# Patient Record
Sex: FEMALE | Race: WHITE | NOT HISPANIC OR LATINO | Employment: OTHER | URBAN - METROPOLITAN AREA
[De-identification: names, ages, dates, MRNs, and addresses within clinical notes are randomized per-mention and may not be internally consistent; named-entity substitution may affect disease eponyms.]

---

## 2017-03-19 ENCOUNTER — HOSPITAL ENCOUNTER (EMERGENCY)
Facility: CLINIC | Age: 82
Discharge: HOME | End: 2017-03-19
Attending: EMERGENCY MEDICINE | Admitting: EMERGENCY MEDICINE
Payer: COMMERCIAL

## 2017-03-19 VITALS
TEMPERATURE: 99.1 F | OXYGEN SATURATION: 98 % | RESPIRATION RATE: 18 BRPM | BODY MASS INDEX: 27.31 KG/M2 | SYSTOLIC BLOOD PRESSURE: 176 MMHG | HEART RATE: 62 BPM | HEIGHT: 64 IN | WEIGHT: 160 LBS | DIASTOLIC BLOOD PRESSURE: 89 MMHG

## 2017-03-19 DIAGNOSIS — T78.40XA ALLERGIC REACTION, INITIAL ENCOUNTER: ICD-10-CM

## 2017-03-19 DIAGNOSIS — R21 FACIAL RASH: ICD-10-CM

## 2017-03-19 PROCEDURE — 99282 EMERGENCY DEPT VISIT SF MDM: CPT

## 2017-03-19 RX ORDER — DESONIDE 0.5 MG/G
CREAM TOPICAL
Qty: 60 G | Refills: 0 | Status: SHIPPED | OUTPATIENT
Start: 2017-03-19 | End: 2018-11-21

## 2017-03-19 RX ORDER — PREDNISONE 20 MG/1
TABLET ORAL
Qty: 12 TABLET | Refills: 0 | Status: SHIPPED | OUTPATIENT
Start: 2017-03-19 | End: 2017-05-05

## 2017-03-19 RX ORDER — PREDNISONE 20 MG/1
TABLET ORAL
Qty: 5 TABLET | Refills: 0 | Status: SHIPPED | OUTPATIENT
Start: 2017-03-19 | End: 2017-03-19

## 2017-03-19 ASSESSMENT — ENCOUNTER SYMPTOMS
EYE PAIN: 0
FEVER: 0
SHORTNESS OF BREATH: 0
CHILLS: 0

## 2017-03-19 NOTE — ED AVS SNAPSHOT
Emergency Department    [address] [facility] [address]    Phone:  [phone]    Fax:  [phone]                                       Mandy Jack   MRN: [id number]    Department:   Emergency Department   Date of Visit:  3/19/2017           Patient Information     Date Of Birth          10/2/1933        Your diagnoses for this visit were:     Facial rash     Allergic reaction, initial encounter        You were seen by Shakeel Garcia MD.      Follow-up Information     Follow up with Panchito Ly MD. Schedule an appointment as soon as possible for a visit in 1 week.    Specialty:  Dermatology    Why:  if not improving    Contact information:    [facility]  [address] [address] [phone]        Discharge References/Attachments     ALLERGIC REACTION, OTHER (GENERAL) (ENGLISH)    DERMATITIS, NONSPECIFIC (ENGLISH)      24 Hour Appointment Hotline       To make an appointment at any [facility], call [phone] ([phone]). If you don't have a family doctor or clinic, we will help you find one. [place] clinics are conveniently located to serve the needs of you and your family.             Review of your medicines      START taking        Dose / Directions Last dose taken    desonide 0.05 % cream   Commonly known as:  DESOWEN   Quantity:  60 g        Apply sparingly to affected area twice daily.   Refills:  0        predniSONE 20 MG tablet   Commonly known as:  DELTASONE   Quantity:  5 tablet        1 tab daily for 7 days, then 1/2 tab daily for 7 days   Refills:  0          Our records show that you are taking the medicines listed below. If these are incorrect, please call your family doctor or clinic.        Dose / Directions Last dose taken    ADVIL PO   Dose:  200 mg        Take 200 mg by mouth 4 times daily as needed for moderate pain   Refills:  0        ALDACTONE PO   Dose:  25 mg        Take 25 mg by mouth daily Take one  half tablet daily   Refills:  0        ASPIRIN PO   Dose:  81 mg        Take 81 mg by mouth daily   Refills:  0        AZASITE 1 % ophthalmic solution   Dose:  1 drop   Generic drug:  azithromycin        Place 1 drop Into the left eye At Bedtime   Refills:  0        CALTRATE PLUS OR        Take by mouth daily 600 mg- 400 units   Refills:  0        COZAAR PO   Dose:  25 mg        Take 25 mg by mouth daily   Refills:  0        hydrochlorothiazide 12.5 MG capsule   Commonly known as:  MICROZIDE   Dose:  12.5 mg        Take 12.5 mg by mouth every morning   Refills:  0        multivitamin, therapeutic Tabs tablet   Dose:  1 tablet        Take 1 tablet by mouth daily   Refills:  0        OMEPRAZOLE PO        Take by mouth daily as needed   Refills:  0        PROLENSA 0.07 % ophthalmic solution   Generic drug:  bromfenac        One drop left eye daily   Refills:  0        TOPROL XL PO   Dose:  50 mg        Take 50 mg by mouth 2 times daily Take 2 tablets daily   Refills:  0        ZOCOR PO   Dose:  20 mg        Take 20 mg by mouth At Bedtime   Refills:  0                Prescriptions were sent or printed at these locations (2 Prescriptions)                   Other Prescriptions                Printed at Department/Unit printer (2 of 2)         desonide (DESOWEN) 0.05 % cream               predniSONE (DELTASONE) 20 MG tablet                Orders Needing Specimen Collection     None      Pending Results     No orders found from 3/17/2017 to 3/20/2017.            Pending Culture Results     No orders found from 3/17/2017 to 3/20/2017.             Test Results from your hospital stay            Clinical Quality Measure: Blood Pressure Screening     Your blood pressure was checked while you were in the emergency department today. The last reading we obtained was  BP: 176/89 . Please read the guidelines below about what these numbers mean and what you should do about them.  If your systolic blood pressure (the top number) is  "less than 120 and your diastolic blood pressure (the bottom number) is less than 80, then your blood pressure is normal. There is nothing more that you need to do about it.  If your systolic blood pressure (the top number) is 120-139 or your diastolic blood pressure (the bottom number) is 80-89, your blood pressure may be higher than it should be. You should have your blood pressure rechecked within a year by a primary care provider.  If your systolic blood pressure (the top number) is 140 or greater or your diastolic blood pressure (the bottom number) is 90 or greater, you may have high blood pressure. High blood pressure is treatable, but if left untreated over time it can put you at risk for heart attack, stroke, or kidney failure. You should have your blood pressure rechecked by a primary care provider within the next 4 weeks.  If your provider in the emergency department today gave you specific instructions to follow-up with your doctor or provider even sooner than that, you should follow that instruction and not wait for up to 4 weeks for your follow-up visit.        Thank you for choosing [facility]       Thank you for choosing [facility] for your care. Our goal is always to provide you with excellent care. Hearing back from our patients is one way we can continue to improve our services. Please take a few minutes to complete the written survey that you may receive in the mail after you visit with us. Thank you!        Material Mix Information     Material Mix lets you send messages to your doctor, view your test results, renew your prescriptions, schedule appointments and more. To sign up, go to [email or web address] . Click on \"Log in\" on the left side of the screen, which will take you to the Welcome page. Then click on \"Sign up Now\" on the right side of the page.     You will be asked to enter the access code listed below, as well as some personal information. Please follow the directions to create your username and " password.     Your access code is: [email or web address]  Expires: 2017  1:46 PM     Your access code will  in 90 days. If you need help or a new code, please call your [facility] clinic or [phone].        Care EveryWhere ID     This is your Care EveryWhere ID. This could be used by other organizations to access your [facility] medical records  [id number]        After Visit Summary       This is your record. Keep this with you and show to your community pharmacist(s) and doctor(s) at your next visit.

## 2017-03-19 NOTE — ED PROVIDER NOTES
History     Chief Complaint:  Rash     HPI   Mandy Jack is a 83 year old female with a history significant for bilateral cataract surgery in 2015 ocular rosacea and facial eczema, who presents with a pruritic rash. During the first week of February, she had right upper eyelid erythema and warmth that then extended to the left. On Feb 23rd she was told by ophthalmologist that it was allergic, and was prescribed Tobradex. Before she began using it, she became all splotchy and red on the face. She started using the drops anyway. She went to get a 2nd ophthalmologist who gave her the same diagnosis. She kept track of activities and new substances used at home to go to the allergist said it could be related to visits and purchases at [employer]. She also stopped using Tobradex on march 5th. She was improving, until Tuesday when she went out and it worsened again. On Friday she talked to a pharmacist and took benadryl.   Yesterday she noticed the rash had extended to  her right anterior neck and hands. It is pruritic and burns, and her lips feel a little swollen, but no SOB, fever, chills, discharge. No visual changes or eye pain. She feels like topical hydrocortisone and the benadryl helped some. She took a benadryl at 8 am today.  and stopped taking the Tobradex on march 5th.   Her dermatologist is Dr. Panchito Ly.    Allergies:  Chlorthalidone   Meperidine  Nitrofurantoin  Septra  Tolterodine     Medications:    ASA  Azithromycin loph  Caltrate plus  HCTz  Losartan  Metoprolol  MVI  Simvastatin  Spironolactone  Bromfenac  Omeprazole     Past Medical History:    Cervical DDD  HTN  Dyslipidemia  Liver disease, nonalcoholic  Recurrent cystitis  Rosacea     Past Surgical History:    JENNI with bilateral salpingo-oophorectomy  Bladder suspension  Cataract surgery bilateral  2015    Family History:    History is non-contributory.     Social History:  Marital Status:   [2]  Smoking status: negative  "  Alcohol status: positive   Patient presents with .  PCP: Vincenzo Hassan      Review of Systems   Constitutional: Negative for chills and fever.   Eyes: Negative for pain and visual disturbance.   Respiratory: Negative for shortness of breath.    Skin: Positive for rash (pruritic and \"burning\").   All other systems reviewed and are negative.      Physical Exam   First Vitals:  BP: 181/76  Pulse: 67  Temp: 99.1  F (37.3  C)  Resp: 18  Height: 162.6 cm (5' 4\")  Weight: 72.6 kg (160 lb)  SpO2: 96 %      Physical Exam  Constitutional: Appears well-developed and well-nourished. Cooperative.    HENT:   Head: Atraumatic.   Mouth/Throat: Oropharynx is without erythema or exudate and mucous membranes are moist.   Eyes: Cornea and conjunctivae normal. EOMI. Pupils are equal, round, and reactive to light. No photophobia.  Neck: Normal range of motion. Neck supple.  Cardiovascular: Normal rate, regular rhythm, normal heart sounds and radial and dorsalis pedis pulses are 2+ and symmetric.   Pulmonary/Chest: Effort normal and breath sounds normal.   Abdominal: Soft. Bowel sounds are normal. No splenomegaly or hepatomegaly. No tenderness. No rebound.  Musculoskeletal: Normal range of motion. No edema and no tenderness.  Neurological: Alert. Normal strength. No cranial nerve deficit. GCS 15.   Skin: Erythematous rash extending from forehead to chin on the right, to cheek on the left. Blotchy erythematous rash on right anterior neck extending down to the clavicle and on dorsum of both thumbs.  Psychiatric: Normal mood and affect.       Emergency Department Course     Emergency Department Course:  Nursing notes and vitals reviewed.  I performed an exam of the patient as documented above.      At 1303, I discussed the patient with ANGELI Diggs, from Dr. Ly's office.    I discussed the findings and treatment plan with the patient. They expressed understanding of this plan and consented to discharge. They will be discharged home " with instructions for care and follow up. In addition, the patient will return to the emergency department if their symptoms persist, worsen, if new symptoms arise or if there is any concern.  All questions were answered.     Impression & Plan      Medical Decision Making:  The patient presents complaining of facial rash/redness and burning sensation, itching and irritation x 2 weeks. Symptoms started after what was thought to be an eyelid infection and using Tobradex drops.   Patient has taken benadryl for a couple of days, symptoms transiently improved but then returned. She presents because her pharmacist advised her to get seen if not improving.   Here the patient has what looks like contact dermatitis or perhaps an allergic/eczematous rash. She has a history of eczema. There are no sign of signs of serious allergic reaction, cellulitis or infection. I talked to the PA on call for the patient's dermatologist who recommended topical desonide and adding low-dose Prednisone if the desonide is not improving symptoms over the next few days.  I discussed the plan with the patient and  and they will start the desonide today and if symptoms do not improve or worsen she will begin with the oral steroids. She will follow-up with dermatology next week. She will continue benadryl for itching. She was discharged in good condition.    Diagnosis:    ICD-10-CM    1. Facial rash R21    2. Allergic reaction, initial encounter T78.40XA      Disposition:   Discharge     Discharge Medications:  New Prescriptions    DESONIDE (DESOWEN) 0.05 % CREAM    Apply sparingly to affected area twice daily.    PREDNISONE (DELTASONE) 20 MG TABLET    1 tab daily for 7 days, then 1/2 tab daily for 7 days       Scribe Disclosure:  SUSAN, Le Brewster, am serving as a scribe at 12:42 PM on 3/19/2017 to document services personally performed by Shakeel Garcia MD, based on my observations and the provider's statements to me.     EMERGENCY  DEPARTMENT       Isely, Shakeel Monterroso MD  03/23/17 [id number]

## 2017-03-19 NOTE — ED AVS SNAPSHOT
Emergency Department    [address] [place] [address]    Phone:  [phone]    Fax:  [phone]                                       Mandy Jack   MRN: [id number]    Department:   Emergency Department   Date of Visit:  3/19/2017           After Visit Summary Signature Page     I have received my discharge instructions, and my questions have been answered. I have discussed any challenges I see with this plan with the nurse or doctor.    ..........................................................................................................................................  Patient/Patient Representative Signature      ..........................................................................................................................................  Patient Representative Print Name and Relationship to Patient    ..................................................               ................................................  Date                                            Time    ..........................................................................................................................................  Reviewed by Signature/Title    ...................................................              ..............................................  Date                                                            Time

## 2017-04-27 ENCOUNTER — PRE VISIT (OUTPATIENT)
Dept: CARDIOLOGY | Facility: CLINIC | Age: 82
End: 2017-04-27

## 2017-05-05 ENCOUNTER — OFFICE VISIT (OUTPATIENT)
Dept: CARDIOLOGY | Facility: CLINIC | Age: 82
End: 2017-05-05
Payer: COMMERCIAL

## 2017-05-05 VITALS
BODY MASS INDEX: 26.29 KG/M2 | DIASTOLIC BLOOD PRESSURE: 72 MMHG | SYSTOLIC BLOOD PRESSURE: 128 MMHG | HEART RATE: 72 BPM | WEIGHT: 154 LBS | HEIGHT: 64 IN

## 2017-05-05 DIAGNOSIS — R00.2 PALPITATIONS: Primary | ICD-10-CM

## 2017-05-05 PROCEDURE — 93000 ELECTROCARDIOGRAM COMPLETE: CPT | Performed by: INTERNAL MEDICINE

## 2017-05-05 PROCEDURE — 99204 OFFICE O/P NEW MOD 45 MIN: CPT | Mod: 25 | Performed by: INTERNAL MEDICINE

## 2017-05-05 RX ORDER — MULTIVITAMIN WITH IRON
1 TABLET ORAL DAILY
COMMUNITY
End: 2022-01-01

## 2017-05-05 NOTE — MR AVS SNAPSHOT
"              After Visit Summary   5/5/2017    Mandy Jack    MRN: [id number]           Patient Information     Date Of Birth          10/2/1933        Visit Information        Provider Department      5/5/2017 10:15 AM Sandor Ngo MD [facility] [place]        Today's Diagnoses     Palpitations    -  1       Follow-ups after your visit        Future tests that were ordered for you today     Open Future Orders        Priority Expected Expires Ordered    US Lower Extremity Arterial Duplex Bilateral Routine 5/12/2017 5/5/2018 5/5/2017    US Low Ext Arterial Dop Seg Pres w Ex (VENKAT with Exercise) Routine 5/12/2017 5/5/2018 5/5/2017    Echocardiogram Routine 5/12/2017 5/5/2018 5/5/2017    Holter Monitor 48 hour - Adult Routine 5/12/2017 5/5/2018 5/5/2017            Who to contact     If you have questions or need follow up information about today's clinic visit or your schedule please contact [facility] directly at [phone].  Normal or non-critical lab and imaging results will be communicated to you by Crowdlyhart, letter or phone within 4 business days after the clinic has received the results. If you do not hear from us within 7 days, please contact the clinic through Crowdly[employer] or phone. If you have a critical or abnormal lab result, we will notify you by phone as soon as possible.  Submit refill requests through [employer] or call your pharmacy and they will forward the refill request to us. Please allow 3 business days for your refill to be completed.          Additional Information About Your Visit        MyChart Information     Prolebrity lets you send messages to your doctor, view your test results, renew your prescriptions, schedule appointments and more. To sign up, go to [email or web address] . Click on \"Log in\" on the left side of the screen, which will take you to the Welcome page. Then click on \"Sign up Now\" on the right " "side of the page.     You will be asked to enter the access code listed below, as well as some personal information. Please follow the directions to create your username and password.     Your access code is: [email or web address]  Expires: 2017  1:46 PM     Your access code will  in 90 days. If you need help or a new code, please call your [facility] clinic or [phone].        Care EveryWhere ID     This is your Care EveryWhere ID. This could be used by other organizations to access your [facility] medical records  [id number]        Your Vitals Were     Pulse Height BMI (Body Mass Index)             72 1.613 m (5' 3.5\") 26.85 kg/m2          Blood Pressure from Last 3 Encounters:   17 128/72   17 176/89   06/10/15 140/74    Weight from Last 3 Encounters:   17 69.9 kg (154 lb)   17 72.6 kg (160 lb)   05/18/15 75.4 kg (166 lb 3.2 oz)              We Performed the Following     EKG 12-lead complete w/read - Clinics (performed today)        Primary Care Provider Office Phone # Fax #    Vincenzo MD Mo [phone]       [facility] [address] [facility] [address]        Thank you!     Thank you for choosing [facility] [place]  for your care. Our goal is always to provide you with excellent care. Hearing back from our patients is one way we can continue to improve our services. Please take a few minutes to complete the written survey that you may receive in the mail after your visit with us. Thank you!             Your Updated Medication List - Protect others around you: Learn how to safely use, store and throw away your medicines at www.disposemymeds.org.          This list is accurate as of: 17 10:17 AM.  Always use your most recent med list.                   Brand Name Dispense Instructions for use    ADVIL PO      Take 200 mg by mouth 4 times daily as needed for moderate pain       ALDACTONE PO      Take 25 mg by mouth daily Take one " half tablet daily       ASPIRIN PO      Take 81 mg by mouth daily       CALTRATE PLUS OR      Take by mouth daily 600 mg- 400 units       COZAAR PO      Take 25 mg by mouth daily       desonide 0.05 % cream    DESOWEN    60 g    Apply sparingly to affected area twice daily.       hydrochlorothiazide 12.5 MG capsule    MICROZIDE     Take 12.5 mg by mouth every morning       magnesium 250 MG tablet      Take 1 tablet by mouth daily       multivitamin, therapeutic Tabs tablet      Take 1 tablet by mouth daily       OMEPRAZOLE PO      Take by mouth daily as needed       TOPROL XL PO      Take 50 mg by mouth daily Take 2 tablets daily       ZOCOR PO      Take 20 mg by mouth At Bedtime

## 2017-05-05 NOTE — LETTER
"5/5/2017    Vincenzo Hassan MD  [facility]  [address] [place] [address]    RE: Mandy Jack       Dear Colleague,    I had the pleasure of seeing Mandy Jack in the [facility].    HPI and Plan:   See dictation  [id number]    Physical Exam:  Vitals: /72  Pulse 72  Ht 1.613 m (5' 3.5\")  Wt 69.9 kg (154 lb)  BMI 26.85 kg/m2    Constitutional:  AAO x3.  Pt is in NAD.  HEAD: normocephalic.  SKIN: Skin normal color, texture and turgor with no lesions or eruptions.  Eyes: PERRL, EOMI.  ENT:  Supple, normal JVP. No lymphadenopathy or thyroid enlargement.  Chest:  CTAB.  Cardiac:   RRR, normal  S1 and S2.  No murmurs rubs or gallop.    Abdomen:  Normal BS.  Soft, non-tender and non-distended.  No rebound or guarding.    Extremities:  Pedious pulses palpable B/L.  No LE edema noticed.   Neurological: Strength and sensation grossly symmetric and intact throughout.       CURRENT MEDICATIONS:  Current Outpatient Prescriptions   Medication Sig Dispense Refill     magnesium 250 MG tablet Take 1 tablet by mouth daily       desonide (DESOWEN) 0.05 % cream Apply sparingly to affected area twice daily. 60 g 0     ASPIRIN PO Take 81 mg by mouth daily       Calcium Carbonate-Vit D-Min (CALTRATE PLUS OR) Take by mouth daily 600 mg- 400 units       hydrochlorothiazide (MICROZIDE) 12.5 MG capsule Take 12.5 mg by mouth every morning       Ibuprofen (ADVIL PO) Take 200 mg by mouth 4 times daily as needed for moderate pain       Losartan Potassium (COZAAR PO) Take 25 mg by mouth daily       Metoprolol Succinate (TOPROL XL PO) Take 50 mg by mouth daily Take 2 tablets daily        multivitamin, therapeutic (THERA-VIT) TABS Take 1 tablet by mouth daily       OMEPRAZOLE PO Take by mouth daily as needed       Simvastatin (ZOCOR PO) Take 20 mg by mouth At Bedtime       Spironolactone (ALDACTONE PO) Take 25 mg by mouth daily Take one half tablet daily         ALLERGIES     Allergies   Allergen " Reactions     Chlorthalidone      Meperidine      Nitrofurantoin      Septra [Sulfamethoxazole W/Trimethoprim]      Tolterodine        PAST MEDICAL HISTORY:  Past Medical History:   Diagnosis Date     Degeneration of cervical intervertebral disc      Hypertension 12/20/2016     Mixed hyperlipidemia      Other chronic nonalcoholic liver disease     steatosis of liver     Other premature beats     symptomatic PVCS     Recurrent cystitis      Rosacea     rosacea conjunctivitis     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       PAST SURGICAL HISTORY:  Past Surgical History:   Procedure Laterality Date     GENITOURINARY SURGERY      bladder suspension     GYN SURGERY      total abdominal hysterectomy     GYN SURGERY      oophorectomy     PHACOEMULSIFICATION CLEAR CORNEA WITH TORIC INTRAOCULAR LENS IMPLANT Left 5/20/2015    Procedure: PHACOEMULSIFICATION CLEAR CORNEA WITH TORIC INTRAOCULAR LENS IMPLANT;  Surgeon: Marquise Hung MD;  Location: [facility]     PHACOEMULSIFICATION CLEAR CORNEA WITH TORIC INTRAOCULAR LENS IMPLANT Right 6/10/2015    Procedure: PHACOEMULSIFICATION CLEAR CORNEA WITH TORIC INTRAOCULAR LENS IMPLANT;  Surgeon: Marquise Hung MD;  Location: [facility]       FAMILY HISTORY:  No family history on file.    SOCIAL HISTORY:  Social History     Social History     Marital status:      Spouse name: N/A     Number of children: N/A     Years of education: N/A     Social History Main Topics     Smoking status: Never Smoker     Smokeless tobacco: None     Alcohol use Yes     Drug use: None     Sexual activity: Not Asked     Other Topics Concern     None     Social History Narrative       Review of Systems:  Skin:  Negative     Eyes:  Negative    ENT:  Negative    Respiratory:  Negative    Cardiovascular:  Negative for;chest pain;edema Positive for;palpitations  Gastroenterology: Negative    Genitourinary:  Positive for urinary frequency;urinary tract infection  Musculoskeletal:  Negative    Neurologic:  Negative     Psychiatric:  Negative    Heme/Lymph/Imm:  Negative    Endocrine:  Positive for diabetes      Recent Lab Results:  LIPID RESULTS:  No results found for: CHOL, HDL, LDL, TRIG, CHOLHDLRATIO    LIVER ENZYME RESULTS:  No results found for: AST, ALT    CBC RESULTS:  No results found for: WBC, RBC, HGB, HCT, MCV, MCH, MCHC, RDW, PLT    BMP RESULTS:  No results found for: NA, POTASSIUM, CHLORIDE, CO2, ANIONGAP, GLC, BUN, CR, GFRESTIMATED, GFRESTBLACK, JAVIER     A1C RESULTS:  No results found for: A1C    INR RESULTS:  No results found for: INR      ECHOCARDIOGRAM  No results found for this or any previous visit (from the past 8760 hour(s)).      Orders Placed This Encounter   Procedures     US Lower Extremity Arterial Duplex Bilateral     US Low Ext Arterial Dop Seg Pres w Ex (VENKAT with Exercise)     EKG 12-lead complete w/read - Clinics (performed today)     Holter Monitor 48 hour - Adult     Echocardiogram     Orders Placed This Encounter   Medications     magnesium 250 MG tablet     Sig: Take 1 tablet by mouth daily     Medications Discontinued During This Encounter   Medication Reason     azithromycin (AZASITE) 1 % ophthalmic solution Stopped by Patient     Bromfenac Sodium (PROLENSA) 0.07 % SOLN Stopped by Patient     predniSONE (DELTASONE) 20 MG tablet Stopped by Patient         Encounter Diagnosis   Name Primary?     Palpitations Yes         CC  Vincenzo Hassan MD  [facility]  [address] [place] [address]                REASON FOR VISIT:  Evaluation of PVCs.      HISTORY OF PRESENT ILLNESS:  Ms. Jack is a delightful 83-year-old lady with a history of hypertension, hyperlipidemia and previous diverticulitis who is here for evaluation of PVCs.      She was followed at the [facility] and is here to establish care with us.  Apparently, in 2014 she was found to have increased PVC burden which was asymptomatic.  At that time, echocardiogram revealed normal cardiac function and she was started on beta blockers.       During this visit, she denies any palpitations.  The only complaint she has is related to lower extremity pain which apparently improved after she started taking supplementation of magnesium.  She also affirms that there are moments that she feels like claudication (pain when walking) which get better after resting.      EKG done here today showed normal sinus rhythm.  Echocardiogram 10/2014 was within normal limits.      ASSESSMENT AND PLAN:   1.  Increased PVC burden.  I recommend repeating a Holter to evaluate PVC burden.  We will continue current metoprolol therapy for now.   2.  Lower extremity pain.  She complais of claudication; however, the pain  seems to be atypical for peripheral arterial disease.  I recommend duplex studies.   3.  Hypertension.  Blood pressure is well controlled.  Continue losartan and beta blockers.  We will obtain an echocardiogram.   4.  Follow up in clinic in a year or earlier as needed.         SANDOR TRAN MD             D: 2017 10:17   T: 2017 23:00   MT: NED      Name:     NAOMI NDIAYE   MRN:      [id number]        Account:      [id number]   :      10/02/1933           Service Date: 2017      Document: [id number]    Thank you for allowing me to participate in the care of your patient.    Sincerely,     Sandor Tran MD     [facility]

## 2017-05-05 NOTE — PROGRESS NOTES
"HPI and Plan:   See dictation  [id number]    Physical Exam:  Vitals: /72  Pulse 72  Ht 1.613 m (5' 3.5\")  Wt 69.9 kg (154 lb)  BMI 26.85 kg/m2    Constitutional:  AAO x3.  Pt is in NAD.  HEAD: normocephalic.  SKIN: Skin normal color, texture and turgor with no lesions or eruptions.  Eyes: PERRL, EOMI.  ENT:  Supple, normal JVP. No lymphadenopathy or thyroid enlargement.  Chest:  CTAB.  Cardiac:   RRR, normal  S1 and S2.  No murmurs rubs or gallop.    Abdomen:  Normal BS.  Soft, non-tender and non-distended.  No rebound or guarding.    Extremities:  Pedious pulses palpable B/L.  No LE edema noticed.   Neurological: Strength and sensation grossly symmetric and intact throughout.       CURRENT MEDICATIONS:  Current Outpatient Prescriptions   Medication Sig Dispense Refill     magnesium 250 MG tablet Take 1 tablet by mouth daily       desonide (DESOWEN) 0.05 % cream Apply sparingly to affected area twice daily. 60 g 0     ASPIRIN PO Take 81 mg by mouth daily       Calcium Carbonate-Vit D-Min (CALTRATE PLUS OR) Take by mouth daily 600 mg- 400 units       hydrochlorothiazide (MICROZIDE) 12.5 MG capsule Take 12.5 mg by mouth every morning       Ibuprofen (ADVIL PO) Take 200 mg by mouth 4 times daily as needed for moderate pain       Losartan Potassium (COZAAR PO) Take 25 mg by mouth daily       Metoprolol Succinate (TOPROL XL PO) Take 50 mg by mouth daily Take 2 tablets daily        multivitamin, therapeutic (THERA-VIT) TABS Take 1 tablet by mouth daily       OMEPRAZOLE PO Take by mouth daily as needed       Simvastatin (ZOCOR PO) Take 20 mg by mouth At Bedtime       Spironolactone (ALDACTONE PO) Take 25 mg by mouth daily Take one half tablet daily         ALLERGIES     Allergies   Allergen Reactions     Chlorthalidone      Meperidine      Nitrofurantoin      Septra [Sulfamethoxazole W/Trimethoprim]      Tolterodine        PAST MEDICAL HISTORY:  Past Medical History:   Diagnosis Date     Degeneration of cervical " intervertebral disc      Hypertension 12/20/2016     Mixed hyperlipidemia      Other chronic nonalcoholic liver disease     steatosis of liver     Other premature beats     symptomatic PVCS     Recurrent cystitis      Rosacea     rosacea conjunctivitis     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       PAST SURGICAL HISTORY:  Past Surgical History:   Procedure Laterality Date     GENITOURINARY SURGERY      bladder suspension     GYN SURGERY      total abdominal hysterectomy     GYN SURGERY      oophorectomy     PHACOEMULSIFICATION CLEAR CORNEA WITH TORIC INTRAOCULAR LENS IMPLANT Left 5/20/2015    Procedure: PHACOEMULSIFICATION CLEAR CORNEA WITH TORIC INTRAOCULAR LENS IMPLANT;  Surgeon: Marquise Hung MD;  Location: [facility]     PHACOEMULSIFICATION CLEAR CORNEA WITH TORIC INTRAOCULAR LENS IMPLANT Right 6/10/2015    Procedure: PHACOEMULSIFICATION CLEAR CORNEA WITH TORIC INTRAOCULAR LENS IMPLANT;  Surgeon: Marquise Hung MD;  Location: [facility]       FAMILY HISTORY:  No family history on file.    SOCIAL HISTORY:  Social History     Social History     Marital status:      Spouse name: N/A     Number of children: N/A     Years of education: N/A     Social History Main Topics     Smoking status: Never Smoker     Smokeless tobacco: None     Alcohol use Yes     Drug use: None     Sexual activity: Not Asked     Other Topics Concern     None     Social History Narrative       Review of Systems:  Skin:  Negative     Eyes:  Negative    ENT:  Negative    Respiratory:  Negative    Cardiovascular:  Negative for;chest pain;edema Positive for;palpitations  Gastroenterology: Negative    Genitourinary:  Positive for urinary frequency;urinary tract infection  Musculoskeletal:  Negative    Neurologic:  Negative    Psychiatric:  Negative    Heme/Lymph/Imm:  Negative    Endocrine:  Positive for diabetes      Recent Lab Results:  LIPID RESULTS:  No results found for: CHOL, HDL, LDL, TRIG, CHOLHDLRATIO    LIVER ENZYME RESULTS:  No results found for:  AST, ALT    CBC RESULTS:  No results found for: WBC, RBC, HGB, HCT, MCV, MCH, MCHC, RDW, PLT    BMP RESULTS:  No results found for: NA, POTASSIUM, CHLORIDE, CO2, ANIONGAP, GLC, BUN, CR, GFRESTIMATED, GFRESTBLACK, JAVIER     A1C RESULTS:  No results found for: A1C    INR RESULTS:  No results found for: INR      ECHOCARDIOGRAM  No results found for this or any previous visit (from the past 8760 hour(s)).      Orders Placed This Encounter   Procedures     US Lower Extremity Arterial Duplex Bilateral     US Low Ext Arterial Dop Seg Pres w Ex (VENKAT with Exercise)     EKG 12-lead complete w/read - Clinics (performed today)     Holter Monitor 48 hour - Adult     Echocardiogram     Orders Placed This Encounter   Medications     magnesium 250 MG tablet     Sig: Take 1 tablet by mouth daily     Medications Discontinued During This Encounter   Medication Reason     azithromycin (AZASITE) 1 % ophthalmic solution Stopped by Patient     Bromfenac Sodium (PROLENSA) 0.07 % SOLN Stopped by Patient     predniSONE (DELTASONE) 20 MG tablet Stopped by Patient         Encounter Diagnosis   Name Primary?     Palpitations Yes         CC  Vincenzo Hassan MD  [facility]  [address] [place] [address]

## 2017-05-06 NOTE — PROGRESS NOTES
REASON FOR VISIT:  Evaluation of PVCs.      HISTORY OF PRESENT ILLNESS:  Ms. Jack is a delightful 83-year-old lady with a history of hypertension, hyperlipidemia and previous diverticulitis who is here for evaluation of PVCs.      She was followed at the [facility] and is here to establish care with us.  Apparently, in  she was found to have increased PVC burden which was asymptomatic.  At that time, echocardiogram revealed normal cardiac function and she was started on beta blockers.      During this visit, she denies any palpitations.  The only complaint she has is related to lower extremity pain which apparently improved after she started taking supplementation of magnesium.  She also affirms that there are moments that she feels like claudication (pain when walking) which get better after resting.      EKG done here today showed normal sinus rhythm.  Echocardiogram 10/2014 was within normal limits.      ASSESSMENT AND PLAN:   1.  Increased PVC burden.  I recommend repeating a Holter to evaluate PVC burden.  We will continue current metoprolol therapy for now.   2.  Lower extremity pain.  She complais of claudication; however, the pain  seems to be atypical for peripheral arterial disease.  I recommend duplex studies.   3.  Hypertension.  Blood pressure is well controlled.  Continue losartan and beta blockers.  We will obtain an echocardiogram.   4.  Follow up in clinic in a year or earlier as needed.         JUNIOR TRAN MD             D: 2017 10:17   T: 2017 23:00   MT: NED      Name:     NAOMI JACK   MRN:      [id number]        Account:      [id number]   :      10/02/1933           Service Date: 2017      Document: [id number]

## 2017-05-12 ENCOUNTER — HOSPITAL ENCOUNTER (OUTPATIENT)
Dept: CARDIOLOGY | Facility: CLINIC | Age: 82
Discharge: HOME | End: 2017-05-12
Attending: INTERNAL MEDICINE | Admitting: INTERNAL MEDICINE
Payer: COMMERCIAL

## 2017-05-12 ENCOUNTER — HOSPITAL ENCOUNTER (OUTPATIENT)
Dept: CARDIOLOGY | Facility: CLINIC | Age: 82
End: 2017-05-12
Attending: INTERNAL MEDICINE
Payer: COMMERCIAL

## 2017-05-12 DIAGNOSIS — R00.2 PALPITATIONS: ICD-10-CM

## 2017-05-12 PROCEDURE — 93226 XTRNL ECG REC<48 HR SCAN A/R: CPT

## 2017-05-12 PROCEDURE — 93227 XTRNL ECG REC<48 HR R&I: CPT | Performed by: INTERNAL MEDICINE

## 2017-05-12 PROCEDURE — 93306 TTE W/DOPPLER COMPLETE: CPT | Mod: 26 | Performed by: INTERNAL MEDICINE

## 2017-05-12 PROCEDURE — 93306 TTE W/DOPPLER COMPLETE: CPT

## 2017-05-15 ENCOUNTER — HOSPITAL ENCOUNTER (OUTPATIENT)
Dept: ULTRASOUND IMAGING | Facility: CLINIC | Age: 82
End: 2017-05-15
Attending: INTERNAL MEDICINE
Payer: COMMERCIAL

## 2017-05-15 ENCOUNTER — TELEPHONE (OUTPATIENT)
Dept: CARDIOLOGY | Facility: CLINIC | Age: 82
End: 2017-05-15

## 2017-05-15 DIAGNOSIS — R00.2 PALPITATIONS: ICD-10-CM

## 2017-05-15 PROCEDURE — 93924 LWR XTR VASC STDY BILAT: CPT

## 2017-05-15 PROCEDURE — 93924 LWR XTR VASC STDY BILAT: CPT | Mod: 26 | Performed by: INTERNAL MEDICINE

## 2017-05-15 NOTE — TELEPHONE ENCOUNTER
Pt calling to see if she can f/u with Dr Ngo on 5/18/17 since her  has an appt on that day. She would like to review holter, ultra sound, and echo with Dr Nog. Informed her that she should not need to f/u with him for results as long as they were unchanged.

## 2017-05-17 ENCOUNTER — TELEPHONE (OUTPATIENT)
Dept: CARDIOLOGY | Facility: CLINIC | Age: 82
End: 2017-05-17

## 2017-05-17 DIAGNOSIS — I49.3 PVC'S (PREMATURE VENTRICULAR CONTRACTIONS): Primary | ICD-10-CM

## 2017-05-17 NOTE — TELEPHONE ENCOUNTER
Message  Received: Today       Sandor Ngo MD  P [facility] Heart Ep Nurse                     Holter showed about 10% PVC burden but echo showed normal cardiac function and she is asymptomatic.  No change in therapy.  Please update pt on results/plan.  Sandor Ngo MD              Writer called pt with results as above and she verbalized understanding. Denies symptoms, will continue with current therapy. LANDY Zavala RN.

## 2017-05-17 NOTE — TELEPHONE ENCOUNTER
Patient informed of echo results and to follow-up in a year per Dr. Ngo's note. Order for follow-up placed in Epic. Patient verbalized understanding. Royal

## 2017-06-06 ENCOUNTER — TELEPHONE (OUTPATIENT)
Dept: CARDIOLOGY | Facility: CLINIC | Age: 82
End: 2017-06-06

## 2017-06-06 NOTE — TELEPHONE ENCOUNTER
Writer called pt with results of US as above. Pt requesting Vascular Consult for consultation regarding varicose veins. Phone call transferred to scheduling. LANDY Zavala RN.

## 2017-06-06 NOTE — TELEPHONE ENCOUNTER
Left message informing patient that VENKAT results are available. Asked patient contact EP RN for results. Royal

## 2017-06-07 ENCOUNTER — PRE VISIT (OUTPATIENT)
Dept: CARDIOLOGY | Facility: CLINIC | Age: 82
End: 2017-06-07

## 2017-06-14 ENCOUNTER — OFFICE VISIT (OUTPATIENT)
Dept: CARDIOLOGY | Facility: CLINIC | Age: 82
End: 2017-06-14
Payer: COMMERCIAL

## 2017-06-14 VITALS
DIASTOLIC BLOOD PRESSURE: 85 MMHG | HEIGHT: 64 IN | WEIGHT: 153.2 LBS | BODY MASS INDEX: 26.15 KG/M2 | HEART RATE: 64 BPM | SYSTOLIC BLOOD PRESSURE: 158 MMHG

## 2017-06-14 DIAGNOSIS — R60.9 EDEMA, UNSPECIFIED TYPE: ICD-10-CM

## 2017-06-14 DIAGNOSIS — I49.3 PVC'S (PREMATURE VENTRICULAR CONTRACTIONS): Primary | ICD-10-CM

## 2017-06-14 PROCEDURE — 99213 OFFICE O/P EST LOW 20 MIN: CPT | Performed by: NURSE PRACTITIONER

## 2017-06-14 RX ORDER — SIMVASTATIN 20 MG
TABLET ORAL AT BEDTIME
Status: ON HOLD | COMMUNITY
End: 2022-01-01

## 2017-06-14 RX ORDER — SPIRONOLACTONE 25 MG/1
TABLET ORAL
Status: ON HOLD | COMMUNITY
End: 2022-01-01

## 2017-06-14 RX ORDER — METOPROLOL SUCCINATE 50 MG/1
50 TABLET, EXTENDED RELEASE ORAL
COMMUNITY

## 2017-06-14 RX ORDER — LOSARTAN POTASSIUM 25 MG/1
25 TABLET ORAL DAILY
COMMUNITY
End: 2017-08-07

## 2017-06-14 NOTE — LETTER
6/14/2017    Vincenzo Hassan MD  [facility]   [address]  [place] [address]    RE: Mandy Jack       Dear Colleague,    I had the pleasure of meeting Ms. Mandy Jack in Vascular Clinic.  She is a delightful 83-year-old who is new to our clinic.  She has a history of hypertension, hyperlipidemia and diverticulitis.  She used to go to [facility] but transferred her care to [facility] when her  had a heart attack requiring surgery, he now follows with Dr. Rush.  She saw Dr. Ngo in May for evaluation of her PVCs.  Her echocardiogram showed normal LV systolic function.  A 48-hour Holter monitor showed a minimum heart rate of 72, a maximum of 104 and average of 64 beats per minute with no pauses noted greater than 2 seconds.  She was on 50 mg of Toprol-XL daily when she wore this.  Her primary rhythm was sinus with frequent PVCs.  She had over 20,000 ventricular ectopics in the 48 hours that she wore it.  She marked the event monitor 4 times and was noted to have frequent PVCs during those times.  Because her LV function was normal, Dr. Ngo made no further changes.  She did have a complaint, when she saw him, of some claudication-type symptoms that had apparently gotten worse since she was spending time with her  in the hospital and not as active as normal.  He recommended exercise ABIs which were done and which did not find any hemodynamically significant obstructive peripheral arterial disease.  She is here today to discuss her varicose veins.      Today Mandy says that she had a history of phlebitis about 30 years ago but otherwise has not had any significant vein problems except that she noticed that when her  was in the hospital a couple months ago she did start having some pain in her legs.  She has a lot of telangiectasias in her feet and ankles and some more obvious larger varicosities in her calves.  She sometimes gets pain at night and has found a  little bit of relief with magnesium but not complete relief.  The pain is mostly on her right leg in the inner aspect of her middle calf and it can get better if she applies pressure with her opposing foot.  She denies any significant swelling at the end of the day.  She did take her 's CATALINA hose from the hospital.  She has been wearing those and says that they help with her symptoms quite a lot.      PHYSICAL EXAMINATION:   GENERAL:  An 83-year-old woman, appears comfortable.   VITAL SIGNS:  Blood pressure 158/85, heart rate 64, weight 153 pounds, BMI 26.   LUNGS:  Clear throughout to auscultation.   CARDIOVASCULAR:  Rate regular, normal S1, S2.   EXTREMITIES:  She has bilateral telangiectasias in her ankles and feet with varicosities extending bilaterally and up into her knee and she has some venous stasis changes.        Outpatient Encounter Prescriptions as of 6/14/2017   Medication Sig Dispense Refill     simvastatin (ZOCOR) 20 MG tablet Take by mouth At Bedtime       spironolactone (ALDACTONE) 25 MG tablet Take 12.5 mg by mouth       losartan (COZAAR) 25 MG tablet Take 25 mg by mouth daily       metoprolol (TOPROL-XL) 50 MG 24 hr tablet Take 50 mg by mouth       magnesium 250 MG tablet Take 1 tablet by mouth daily       desonide (DESOWEN) 0.05 % cream Apply sparingly to affected area twice daily. 60 g 0     ASPIRIN PO Take 81 mg by mouth daily       Calcium Carbonate-Vit D-Min (CALTRATE PLUS OR) Take by mouth daily 600 mg- 400 units       hydrochlorothiazide (MICROZIDE) 12.5 MG capsule Take 12.5 mg by mouth every morning       Ibuprofen (ADVIL PO) Take 200 mg by mouth 4 times daily as needed for moderate pain       multivitamin, therapeutic (THERA-VIT) TABS Take 1 tablet by mouth daily       [DISCONTINUED] Losartan Potassium (COZAAR PO) Take 25 mg by mouth daily       [DISCONTINUED] Metoprolol Succinate (TOPROL XL PO) Take 50 mg by mouth daily Take 2 tablets daily        [DISCONTINUED] OMEPRAZOLE PO Take  by mouth daily as needed       [DISCONTINUED] Simvastatin (ZOCOR PO) Take 20 mg by mouth At Bedtime       [DISCONTINUED] Spironolactone (ALDACTONE PO) Take 25 mg by mouth daily Take one half tablet daily       No facility-administered encounter medications on file as of 6/14/2017.      ASSESSMENT AND PLAN:   1.  Varicose veins.  It is possible she has venous insufficiency based on her symptoms of leg pain at night and also the relief she is experiencing using her 's compression stockings.  I did fit her with Jobst 20-30 mmHg size small stocking.  I gave her a pair of these and put them on her without any difficulty.  She does have from her 's surgery some kind of stocking adalberto at home which she said that she can use.  We will go ahead and get venous competency studies done and I recommended she wear the compression stockings.  We will have her meet with one of our vascular physicians in 2 to 2-1/2 months to discuss if she is a candidate for venous ablation.  I did go over the procedure a little bit with her and gave her some literature regarding the procedure.   2.  Frequent PVCs.  These have not caused any LV dysfunction and Dr. Ngo would not make any changes at this point.  I continued her on Toprol-XL 50 mg.  She says previously she was on it twice a day but her primary cut it down for fatigue and she does think that has helped with the fatigue so we will just leave her on that dose.   3.  Hypertension.  Her blood pressure is elevated today.  I offered to make some adjustments to her medication, but she would rather talk to her primary, Dr. Hassan, about that.  So I have kept her on Aldactone 12.5, losartan 25 mg, Toprol 50 mg daily and hydrochlorothiazide 12.5 mg daily.            Thank you for allowing me to see Ms. Jack today.  Should her venous competency studies fail to show venous insufficiency, then she would not need to follow up with Dr. Pereira in August.     Sincerely,    Bertha Love  SHEILA Erazo CNP     [facility]

## 2017-06-14 NOTE — PATIENT INSTRUCTIONS
You were a little hypertensive during your echo, and today in clinic. You could consider increasing one of your medications for blood pressure, I'll leave it up to your PMD.    Wear the compression stocking, continue to walk. We will do venous competency studies and have you meet with one of our vascular doctors for follow up. You may be a candidate for a venous ablation, we will see what the studies show.    [facility]  [phone]

## 2017-06-14 NOTE — MR AVS SNAPSHOT
After Visit Summary   6/14/2017    Mandy Jack    MRN: [id number]           Patient Information     Date Of Birth          10/2/1933        Visit Information        Provider Department      6/14/2017 1:10 PM Bertha Erazo APRN CNP [facility]        Today's Diagnoses     PVC's (premature ventricular contractions)    -  1    Edema, unspecified type          Care Instructions    You were a little hypertensive during your echo, and today in clinic. You could consider increasing one of your medications for blood pressure, I'll leave it up to your PMD.    Wear the compression stocking, continue to walk. We will do venous competency studies and have you meet with one of our vascular doctors for follow up. You may be a candidate for a venous ablation, we will see what the studies show.    Lizet  [phone]          Follow-ups after your visit        Additional Services     Follow-Up with Vascular Cardiologist                 Future tests that were ordered for you today     Open Future Orders        Priority Expected Expires Ordered    US Venous Competency Bilateral Routine 7/14/2017 6/14/2018 6/14/2017    Follow-Up with Vascular Cardiologist Routine 7/28/2017 6/14/2018 6/14/2017            Who to contact     If you have questions or need follow up information about today's clinic visit or your schedule please contact [facility] directly at [phone].  Normal or non-critical lab and imaging results will be communicated to you by MyChart, letter or phone within 4 business days after the clinic has received the results. If you do not hear from us within 7 days, please contact the clinic through MyChart or phone. If you have a critical or abnormal lab result, we will notify you by phone as soon as possible.  Submit refill requests through [employer] or call your pharmacy and they will forward the refill  "request to us. Please allow 3 business days for your refill to be completed.          Additional Information About Your Visit        [facility] Information     [facility] lets you send messages to your doctor, view your test results, renew your prescriptions, schedule appointments and more. To sign up, go to [email or web address] . Click on \"Log in\" on the left side of the screen, which will take you to the Welcome page. Then click on \"Sign up Now\" on the right side of the page.     You will be asked to enter the access code listed below, as well as some personal information. Please follow the directions to create your username and password.     Your access code is: [email or web address]  Expires: 2017  1:46 PM     Your access code will  in 90 days. If you need help or a new code, please call your [place] clinic or [phone].        Care EveryWhere ID     This is your Care EveryWhere ID. This could be used by other organizations to access your [place] medical records  [id number]        Your Vitals Were     Pulse Height BMI (Body Mass Index)             64 1.626 m (5' 4\") 26.3 kg/m2          Blood Pressure from Last 3 Encounters:   17 158/85   17 128/72   17 176/89    Weight from Last 3 Encounters:   17 69.5 kg (153 lb 3.2 oz)   17 69.9 kg (154 lb)   17 72.6 kg (160 lb)                 Today's Medication Changes          These changes are accurate as of: 17  1:47 PM.  If you have any questions, ask your nurse or doctor.               These medicines have changed or have updated prescriptions.        Dose/Directions    metoprolol 50 MG 24 hr tablet   Commonly known as:  TOPROL-XL   This may have changed:  Another medication with the same name was removed. Continue taking this medication, and follow the directions you see here.   Changed by:  Bertha Erazo APRN CNP        Dose:  50 mg   Take 50 mg by mouth   Refills:  0                Primary Care Provider " Office Phone # Fax #    Vincenzo MD Mo [phone]       [employer] [address]  [place] [address]        Thank you!     Thank you for choosing [facility] [place]  for your care. Our goal is always to provide you with excellent care. Hearing back from our patients is one way we can continue to improve our services. Please take a few minutes to complete the written survey that you may receive in the mail after your visit with us. Thank you!             Your Updated Medication List - Protect others around you: Learn how to safely use, store and throw away your medicines at www.disposemymeds.org.          This list is accurate as of: 6/14/17  1:47 PM.  Always use your most recent med list.                   Brand Name Dispense Instructions for use    ADVIL PO      Take 200 mg by mouth 4 times daily as needed for moderate pain       ASPIRIN PO      Take 81 mg by mouth daily       CALTRATE PLUS OR      Take by mouth daily 600 mg- 400 units       desonide 0.05 % cream    DESOWEN    60 g    Apply sparingly to affected area twice daily.       hydrochlorothiazide 12.5 MG capsule    MICROZIDE     Take 12.5 mg by mouth every morning       losartan 25 MG tablet    COZAAR     Take 25 mg by mouth daily       magnesium 250 MG tablet      Take 1 tablet by mouth daily       metoprolol 50 MG 24 hr tablet    TOPROL-XL     Take 50 mg by mouth       multivitamin, therapeutic Tabs tablet      Take 1 tablet by mouth daily       simvastatin 20 MG tablet    ZOCOR     Take by mouth At Bedtime       spironolactone 25 MG tablet    ALDACTONE     Take 12.5 mg by mouth

## 2017-06-14 NOTE — PROGRESS NOTES
HPI and Plan:   See dictation    Orders Placed This Encounter   Procedures     US Venous Competency Bilateral     Follow-Up with Vascular Cardiologist       Orders Placed This Encounter   Medications     simvastatin (ZOCOR) 20 MG tablet     Sig: Take by mouth At Bedtime     spironolactone (ALDACTONE) 25 MG tablet     Sig: Take 12.5 mg by mouth     losartan (COZAAR) 25 MG tablet     Sig: Take 25 mg by mouth daily     metoprolol (TOPROL-XL) 50 MG 24 hr tablet     Sig: Take 50 mg by mouth       Medications Discontinued During This Encounter   Medication Reason     OMEPRAZOLE PO Stopped by Patient     Simvastatin (ZOCOR PO) Medication Reconciliation Clean Up     Spironolactone (ALDACTONE PO) Medication Reconciliation Clean Up     Losartan Potassium (COZAAR PO) Medication Reconciliation Clean Up     Metoprolol Succinate (TOPROL XL PO) Dose adjustment         Encounter Diagnoses   Name Primary?     PVC's (premature ventricular contractions) Yes     Edema, unspecified type        CURRENT MEDICATIONS:  Current Outpatient Prescriptions   Medication Sig Dispense Refill     simvastatin (ZOCOR) 20 MG tablet Take by mouth At Bedtime       spironolactone (ALDACTONE) 25 MG tablet Take 12.5 mg by mouth       losartan (COZAAR) 25 MG tablet Take 25 mg by mouth daily       metoprolol (TOPROL-XL) 50 MG 24 hr tablet Take 50 mg by mouth       magnesium 250 MG tablet Take 1 tablet by mouth daily       desonide (DESOWEN) 0.05 % cream Apply sparingly to affected area twice daily. 60 g 0     ASPIRIN PO Take 81 mg by mouth daily       Calcium Carbonate-Vit D-Min (CALTRATE PLUS OR) Take by mouth daily 600 mg- 400 units       hydrochlorothiazide (MICROZIDE) 12.5 MG capsule Take 12.5 mg by mouth every morning       Ibuprofen (ADVIL PO) Take 200 mg by mouth 4 times daily as needed for moderate pain       multivitamin, therapeutic (THERA-VIT) TABS Take 1 tablet by mouth daily       [DISCONTINUED] Losartan Potassium (COZAAR PO) Take 25 mg by mouth  daily       [DISCONTINUED] Metoprolol Succinate (TOPROL XL PO) Take 50 mg by mouth daily Take 2 tablets daily        [DISCONTINUED] Simvastatin (ZOCOR PO) Take 20 mg by mouth At Bedtime       [DISCONTINUED] Spironolactone (ALDACTONE PO) Take 25 mg by mouth daily Take one half tablet daily         ALLERGIES     Allergies   Allergen Reactions     Chlorthalidone      Fentanyl      Low HR during cataract surgery     Meperidine      Nitrofurantoin      Septra [Sulfamethoxazole W/Trimethoprim]      Tolterodine        PAST MEDICAL HISTORY:  Past Medical History:   Diagnosis Date     Degeneration of cervical intervertebral disc      Hypertension 12/20/2016     Mixed hyperlipidemia      Other chronic nonalcoholic liver disease     steatosis of liver     Other premature beats     symptomatic PVCS     Recurrent cystitis      Rosacea     rosacea conjunctivitis     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       PAST SURGICAL HISTORY:  Past Surgical History:   Procedure Laterality Date     GENITOURINARY SURGERY      bladder suspension     GYN SURGERY      total abdominal hysterectomy     GYN SURGERY      oophorectomy     PHACOEMULSIFICATION CLEAR CORNEA WITH TORIC INTRAOCULAR LENS IMPLANT Left 5/20/2015    Procedure: PHACOEMULSIFICATION CLEAR CORNEA WITH TORIC INTRAOCULAR LENS IMPLANT;  Surgeon: Marquise Hung MD;  Location: [facility]     PHACOEMULSIFICATION CLEAR CORNEA WITH TORIC INTRAOCULAR LENS IMPLANT Right 6/10/2015    Procedure: PHACOEMULSIFICATION CLEAR CORNEA WITH TORIC INTRAOCULAR LENS IMPLANT;  Surgeon: Marquise Hung MD;  Location: [facility]       FAMILY HISTORY:  Family History   Problem Relation Age of Onset     DIABETES Mother        SOCIAL HISTORY:  Social History     Social History     Marital status:      Spouse name: N/A     Number of children: N/A     Years of education: N/A     Social History Main Topics     Smoking status: Never Smoker     Smokeless tobacco: None     Alcohol use Yes     Drug use: None     Sexual  "activity: Not Asked     Other Topics Concern     None     Social History Narrative       Review of Systems:  Skin:  Negative       Eyes:  Negative      ENT:  Negative      Respiratory:  Negative       Cardiovascular:  Negative;chest pain;lightheadedness;dizziness;cyanosis;syncope or near-syncope;exercise intolerance;fatigue;edema palpitations;Positive for;lower extremity symptoms states has compression stockings, has been wearing for approx a month, not currently wearing today  Gastroenterology: Positive for   states has loose bowels since starting magnesium  Genitourinary:  Positive for urinary frequency    Musculoskeletal:  Positive for nocturnal cramping    Neurologic:  Negative      Psychiatric:  Negative      Heme/Lymph/Imm:  Negative      Endocrine:  Positive for   pre diabetic    Physical Exam:  Vitals: /85 (BP Location: Left arm, Cuff Size: Adult Large)  Pulse 64  Ht 1.626 m (5' 4\")  Wt 69.5 kg (153 lb 3.2 oz)  BMI 26.3 kg/m2    Constitutional:  cooperative;alert and oriented        Skin:  warm and dry to the touch        Head:  normocephalic        Eyes:  pupils equal and round, conjunctivae and lids unremarkable, sclera white, no xanthalasma, EOMS intact, no nystagmus        ENT:  no pallor or cyanosis        Neck:  JVP normal        Chest:  normal breath sounds, clear to auscultation, normal A-P diameter, normal symmetry, normal respiratory excursion, no use of accessory muscles          Cardiac: regular rhythm;normal S1 and S2                  Abdomen:           Vascular: pulses full and equal                                        Extremities and Back:  no edema   telangiectasia;varicose vein     C4    Neurological:  no gross motor deficits;affect appropriate              CC  No referring provider defined for this encounter.              "

## 2017-06-15 NOTE — PROGRESS NOTES
HISTORY OF PRESENT ILLNESS:  I had the pleasure of meeting Ms. Mandy Jack in Vascular Clinic.  She is a delightful 83-year-old who is new to our clinic.  She has a history of hypertension, hyperlipidemia and diverticulitis.  She used to go to [facility] but transferred her care to [place] when her  had a heart attack requiring surgery, he now follows with Dr. Rush.  She saw Dr. Ngo in May for evaluation of her PVCs.  Her echocardiogram showed normal LV systolic function.  A 48-hour Holter monitor showed a minimum heart rate of 72, a maximum of 104 and average of 64 beats per minute with no pauses noted greater than 2 seconds.  She was on 50 mg of Toprol-XL daily when she wore this.  Her primary rhythm was sinus with frequent PVCs.  She had over 20,000 ventricular ectopics in the 48 hours that she wore it.  She marked the event monitor 4 times and was noted to have frequent PVCs during those times.  Because her LV function was normal, Dr. Ngo made no further changes.  She did have a complaint, when she saw him, of some claudication-type symptoms that had apparently gotten worse since she was spending time with her  in the hospital and not as active as normal.  He recommended exercise ABIs which were done and which did not find any hemodynamically significant obstructive peripheral arterial disease.  She is here today to discuss her varicose veins.      Today Mandy says that she had a history of phlebitis about 30 years ago but otherwise has not had any significant vein problems except that she noticed that when her  was in the hospital a couple months ago she did start having some pain in her legs.  She has a lot of telangiectasias in her feet and ankles and some more obvious larger varicosities in her calves.  She sometimes gets pain at night and has found a little bit of relief with magnesium but not complete relief.  The pain is mostly on her right leg in the  inner aspect of her middle calf and it can get better if she applies pressure with her opposing foot.  She denies any significant swelling at the end of the day.  She did take her 's CATALINA hose from the hospital.  She has been wearing those and says that they help with her symptoms quite a lot.      PHYSICAL EXAMINATION:   GENERAL:  An 83-year-old woman, appears comfortable.   VITAL SIGNS:  Blood pressure 158/85, heart rate 64, weight 153 pounds, BMI 26.   LUNGS:  Clear throughout to auscultation.   CARDIOVASCULAR:  Rate regular, normal S1, S2.   EXTREMITIES:  She has bilateral telangiectasias in her ankles and feet with varicosities extending bilaterally and up into her knee and she has some venous stasis changes.        ASSESSMENT AND PLAN:   1.  Varicose veins.  It is possible she has venous insufficiency based on her symptoms of leg pain at night and also the relief she is experiencing using her 's compression stockings.  I did fit her with Jobst 20-30 mmHg size small stocking.  I gave her a pair of these and put them on her without any difficulty.  She does have from her 's surgery some kind of stocking adalberto at home which she said that she can use.  We will go ahead and get venous competency studies done and I recommended she wear the compression stockings.  We will have her meet with one of our vascular physicians in 2 to 2-1/2 months to discuss if she is a candidate for venous ablation.  I did go over the procedure a little bit with her and gave her some literature regarding the procedure.   2.  Frequent PVCs.  These have not caused any LV dysfunction and Dr. Ngo would not make any changes at this point.  I continued her on Toprol-XL 50 mg.  She says previously she was on it twice a day but her primary cut it down for fatigue and she does think that has helped with the fatigue so we will just leave her on that dose.   3.  Hypertension.  Her blood pressure is elevated today.  I offered  to make some adjustments to her medication, but she would rather talk to her primary, Dr. Hassan, about that.  So I have kept her on Aldactone 12.5, losartan 25 mg, Toprol 50 mg daily and hydrochlorothiazide 12.5 mg daily.      Thank you for allowing me to see Ms. Jack today.  Should her venous competency studies fail to show venous insufficiency, then she would not need to follow up with Dr. Pereira in August.         SHEILA GARCIA, JAYE             D: 2017 14:27   T: 06/15/2017 10:18   MT: eric      Name:     NAOMI JACK   MRN:      [id number]        Account:      [id number]   :      10/02/1933           Service Date: 2017      Document: [id number]

## 2017-07-19 ENCOUNTER — RADIANT APPOINTMENT (OUTPATIENT)
Dept: VASCULAR ULTRASOUND | Facility: CLINIC | Age: 82
End: 2017-07-19
Attending: NURSE PRACTITIONER
Payer: COMMERCIAL

## 2017-07-19 ENCOUNTER — TELEPHONE (OUTPATIENT)
Dept: CARDIOLOGY | Facility: CLINIC | Age: 82
End: 2017-07-19

## 2017-07-19 DIAGNOSIS — R60.9 EDEMA, UNSPECIFIED TYPE: ICD-10-CM

## 2017-07-19 PROCEDURE — 93970 EXTREMITY STUDY: CPT | Performed by: INTERNAL MEDICINE

## 2017-07-19 NOTE — TELEPHONE ENCOUNTER
Notes Recorded by Bertha Erazo, APRN CNP on 7/19/2017 at 3:18 PM  She does have reflux based on study, keep f/u with Dr. Pereira. Thanks, Lizet    Spoke to patient about results above. Confirmed OV with Dr. Pereira 8/7/17. Pt stated that the compression stockings have been helping. Writer informed patient to continue to wear the stockings. No further questions or concerns.

## 2017-08-07 ENCOUNTER — OFFICE VISIT (OUTPATIENT)
Dept: CARDIOLOGY | Facility: CLINIC | Age: 82
End: 2017-08-07
Attending: NURSE PRACTITIONER
Payer: COMMERCIAL

## 2017-08-07 VITALS
DIASTOLIC BLOOD PRESSURE: 80 MMHG | WEIGHT: 151.2 LBS | SYSTOLIC BLOOD PRESSURE: 162 MMHG | HEIGHT: 64 IN | HEART RATE: 58 BPM | BODY MASS INDEX: 25.81 KG/M2

## 2017-08-07 DIAGNOSIS — I49.3 PVC'S (PREMATURE VENTRICULAR CONTRACTIONS): ICD-10-CM

## 2017-08-07 DIAGNOSIS — R60.9 EDEMA, UNSPECIFIED TYPE: Primary | ICD-10-CM

## 2017-08-07 PROCEDURE — 99213 OFFICE O/P EST LOW 20 MIN: CPT | Mod: 25 | Performed by: INTERNAL MEDICINE

## 2017-08-07 PROCEDURE — 93000 ELECTROCARDIOGRAM COMPLETE: CPT | Performed by: INTERNAL MEDICINE

## 2017-08-07 RX ORDER — LOSARTAN POTASSIUM 50 MG/1
50 TABLET ORAL DAILY
COMMUNITY

## 2017-08-07 NOTE — PROGRESS NOTES
"Vascular Cardiology Progress Note          Assessment and Plan:     1. 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 She is happy to know that a venous ablation with sclerotherapy would be a good option for her if symptoms progress.    This note was transcribed using electronic voice recognition software and there may be typographical errors present.                Interval History:   The patient is a very pleasant 83 year old whom I am meeting for the first time. She has severe R GSV venous incompetence and symptomatic venous varicosities in that lower extremity. She had nocturnal cramping when her  was sick with coronary disease and she was many hours at the hospital.    Currently she feels that her legs are doing okay with the addition of compression stockings.                     Review of Systems:   Review of Systems:  Skin:  Negative     Eyes:  Positive for glasses  ENT:  Negative    Respiratory:  Negative    Cardiovascular:  Negative    Gastroenterology: Negative    Genitourinary:  not assessed    Musculoskeletal:  Negative    Neurologic:  Negative    Psychiatric:  Negative    Heme/Lymph/Imm:  Negative    Endocrine:  Negative                Physical Exam:     Vitals: /80  Pulse 58  Ht 1.626 m (5' 4.02\")  Wt 68.6 kg (151 lb 3.2 oz)  BMI 25.94 kg/m2  Constitutional:  cooperative;alert and oriented        Skin:  warm and dry to the touch        Head:  normocephalic        Eyes:  pupils equal and round, conjunctivae and lids unremarkable, sclera white, no xanthalasma, EOMS intact, no nystagmus        ENT:  no pallor or cyanosis        Neck:  JVP normal        Chest:  normal breath sounds, clear to auscultation, normal A-P diameter, normal symmetry, normal respiratory excursion, no use of accessory muscles        Cardiac: regular rhythm;normal S1 and S2              "     Abdomen:      benign    Vascular: pulses full and equal                                      Extremities and Back:  no edema   C4, R>L Venous varicosities and early skin changes    Neurological:  no gross motor deficits;affect appropriate                 Medications:     Current Outpatient Prescriptions   Medication Sig Dispense Refill     losartan (COZAAR) 50 MG tablet Take 50 mg by mouth daily       simvastatin (ZOCOR) 20 MG tablet Take by mouth At Bedtime       spironolactone (ALDACTONE) 25 MG tablet Take 12.5 mg by mouth       metoprolol (TOPROL-XL) 50 MG 24 hr tablet Take 50 mg by mouth       magnesium 250 MG tablet Take 1 tablet by mouth daily       desonide (DESOWEN) 0.05 % cream Apply sparingly to affected area twice daily. 60 g 0     ASPIRIN PO Take 81 mg by mouth daily       Calcium Carbonate-Vit D-Min (CALTRATE PLUS OR) Take by mouth daily 600 mg- 400 units       hydrochlorothiazide (MICROZIDE) 12.5 MG capsule Take 12.5 mg by mouth every morning       Ibuprofen (ADVIL PO) Take 200 mg by mouth 4 times daily as needed for moderate pain       multivitamin, therapeutic (THERA-VIT) TABS Take 1 tablet by mouth daily       [DISCONTINUED] losartan (COZAAR) 25 MG tablet Take 25 mg by mouth daily                  Data:   All laboratory data reviewed  No results found for this or any previous visit (from the past 24 hour(s)).    All laboratory data reviewed  No results found for: CHOL  No results found for: HDL  No results found for: LDL  No results found for: TRIG  No results found for: CHOLHDLRATIO  No results found for: TSH  Last Basic Metabolic Panel:  No results found for: NA   No results found for: POTASSIUM  No results found for: CHLORIDE  No results found for: JAVIER  No results found for: CO2  No results found for: BUN  No results found for: CR  No results found for: GLC  No results found for: WBC  No results found for: RBC  No results found for: HGB  No results found for: HCT  No results found for:  MCV  No results found for: MCH  No results found for: MCHC  No results found for: RDW  No results found for: PLT

## 2017-08-07 NOTE — MR AVS SNAPSHOT
"              After Visit Summary   8/7/2017    Mandy Jack    MRN: [id number]           Patient Information     Date Of Birth          10/2/1933        Visit Information        Provider Department      8/7/2017 9:15 AM Farshad Pereira MD [facility] HEART [facility] [place]        Today's Diagnoses     Edema, unspecified type    -  1    PVC's (premature ventricular contractions)           Follow-ups after your visit        Additional Services     Follow-Up with Cardiac Advanced Practice Provider                 Future tests that were ordered for you today     Open Future Orders        Priority Expected Expires Ordered    Follow-Up with Cardiac Advanced Practice Provider Routine 5/4/2018 8/7/2018 8/7/2017            Who to contact     If you have questions or need follow up information about today's clinic visit or your schedule please contact [facility] directly at [phone].  Normal or non-critical lab and imaging results will be communicated to you by "[employer]hart, letter or phone within 4 business days after the clinic has received the results. If you do not hear from us within 7 days, please contact the clinic through "[employer]hart or phone. If you have a critical or abnormal lab result, we will notify you by phone as soon as possible.  Submit refill requests through [employer] or call your pharmacy and they will forward the refill request to us. Please allow 3 business days for your refill to be completed.          Additional Information About Your Visit        MyChart Information     [employer] lets you send messages to your doctor, view your test results, renew your prescriptions, schedule appointments and more. To sign up, go to [email or web address] . Click on \"Log in\" on the left side of the screen, which will take you to the Welcome page. Then click on \"Sign up Now\" on the right side of the page.     You will be asked to enter the access code " "listed below, as well as some personal information. Please follow the directions to create your username and password.     Your access code is: [email or web address]  Expires: 2017  9:57 AM     Your access code will  in 90 days. If you need help or a new code, please call your [place] clinic or [phone].        Care EveryWhere ID     This is your Care EveryWhere ID. This could be used by other organizations to access your [place] medical records  [id number]        Your Vitals Were     Pulse Height BMI (Body Mass Index)             58 1.626 m (5' 4.02\") 25.94 kg/m2          Blood Pressure from Last 3 Encounters:   17 162/80   17 158/85   17 128/72    Weight from Last 3 Encounters:   17 68.6 kg (151 lb 3.2 oz)   17 69.5 kg (153 lb 3.2 oz)   17 69.9 kg (154 lb)              We Performed the Following     EKG 12-lead complete w/read - Clinics (performed today)     Follow-Up with Vascular Cardiologist        Primary Care Provider Office Phone # Fax #    Vincenzo Hassan [phone]       [facility] [address] [facility] [address]        Equal Access to Services     [facility]: Hadii aad ku hadasho Soomaali, waaxda luqadaha, qaybta kaalmada adeegyada, heidi olguin . So [facility] [phone].    ATENCIÓN: Si habla español, tiene a choi disposición servicios gratuitos de asistencia lingüística. Llame al [phone].    We comply with applicable federal civil rights laws and [place] laws. We do not discriminate on the basis of race, color, national origin, age, disability sex, sexual orientation or gender identity.            Thank you!     Thank you for choosing [facility] HE[facility] AT [place]  for your care. Our goal is always to provide you with excellent care. Hearing back from our patients is one way we can continue to improve our services. Please take a few minutes to complete the written survey that you may " receive in the mail after your visit with us. Thank you!             Your Updated Medication List - Protect others around you: Learn how to safely use, store and throw away your medicines at www.disposemymeds.org.          This list is accurate as of: 8/7/17  9:57 AM.  Always use your most recent med list.                   Brand Name Dispense Instructions for use Diagnosis    ADVIL PO      Take 200 mg by mouth 4 times daily as needed for moderate pain        ASPIRIN PO      Take 81 mg by mouth daily        CALTRATE PLUS OR      Take by mouth daily 600 mg- 400 units        desonide 0.05 % cream    DESOWEN    60 g    Apply sparingly to affected area twice daily.        hydrochlorothiazide 12.5 MG capsule    MICROZIDE     Take 12.5 mg by mouth every morning        losartan 50 MG tablet    COZAAR     Take 50 mg by mouth daily        magnesium 250 MG tablet      Take 1 tablet by mouth daily        metoprolol 50 MG 24 hr tablet    TOPROL-XL     Take 50 mg by mouth        multivitamin, therapeutic Tabs tablet      Take 1 tablet by mouth daily        simvastatin 20 MG tablet    ZOCOR     Take by mouth At Bedtime        spironolactone 25 MG tablet    ALDACTONE     Take 12.5 mg by mouth

## 2017-08-07 NOTE — LETTER
"8/7/2017    Vincenzo Hassan MD  [employer]   [address]  [place] [address]    RE: Mandy Jack       Dear Colleague,    I had the pleasure of seeing Mandy Jack in the [facility].    Vascular Cardiology Progress Note          Assessment and Plan:     1. 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 She is happy to know that a venous ablation with sclerotherapy would be a good option for her if symptoms progress.    This note was transcribed using electronic voice recognition software and there may be typographical errors present.                Interval History:   The patient is a very pleasant 83 year old whom I am meeting for the first time. She has severe R GSV venous incompetence and symptomatic venous varicosities in that lower extremity. She had nocturnal cramping when her  was sick with coronary disease and she was many hours at the hospital.    Currently she feels that her legs are doing okay with the addition of compression stockings.                     Review of Systems:   Review of Systems:  Skin:  Negative     Eyes:  Positive for glasses  ENT:  Negative    Respiratory:  Negative    Cardiovascular:  Negative    Gastroenterology: Negative    Genitourinary:  not assessed    Musculoskeletal:  Negative    Neurologic:  Negative    Psychiatric:  Negative    Heme/Lymph/Imm:  Negative    Endocrine:  Negative                Physical Exam:     Vitals: /80  Pulse 58  Ht 1.626 m (5' 4.02\")  Wt 68.6 kg (151 lb 3.2 oz)  BMI 25.94 kg/m2  Constitutional:  cooperative;alert and oriented        Skin:  warm and dry to the touch        Head:  normocephalic        Eyes:  pupils equal and round, conjunctivae and lids unremarkable, sclera white, no xanthalasma, EOMS intact, no nystagmus        ENT:  no pallor or cyanosis    "     Neck:  JVP normal        Chest:  normal breath sounds, clear to auscultation, normal A-P diameter, normal symmetry, normal respiratory excursion, no use of accessory muscles        Cardiac: regular rhythm;normal S1 and S2                  Abdomen:      benign    Vascular: pulses full and equal                                      Extremities and Back:  no edema   C4, R>L Venous varicosities and early skin changes    Neurological:  no gross motor deficits;affect appropriate                 Medications:     Current Outpatient Prescriptions   Medication Sig Dispense Refill     losartan (COZAAR) 50 MG tablet Take 50 mg by mouth daily       simvastatin (ZOCOR) 20 MG tablet Take by mouth At Bedtime       spironolactone (ALDACTONE) 25 MG tablet Take 12.5 mg by mouth       metoprolol (TOPROL-XL) 50 MG 24 hr tablet Take 50 mg by mouth       magnesium 250 MG tablet Take 1 tablet by mouth daily       desonide (DESOWEN) 0.05 % cream Apply sparingly to affected area twice daily. 60 g 0     ASPIRIN PO Take 81 mg by mouth daily       Calcium Carbonate-Vit D-Min (CALTRATE PLUS OR) Take by mouth daily 600 mg- 400 units       hydrochlorothiazide (MICROZIDE) 12.5 MG capsule Take 12.5 mg by mouth every morning       Ibuprofen (ADVIL PO) Take 200 mg by mouth 4 times daily as needed for moderate pain       multivitamin, therapeutic (THERA-VIT) TABS Take 1 tablet by mouth daily       [DISCONTINUED] losartan (COZAAR) 25 MG tablet Take 25 mg by mouth daily                  Data:   All laboratory data reviewed  No results found for this or any previous visit (from the past 24 hour(s)).    All laboratory data reviewed  No results found for: CHOL  No results found for: HDL  No results found for: LDL  No results found for: TRIG  No results found for: CHOLHDLRATIO  No results found for: TSH  Last Basic Metabolic Panel:  No results found for: NA   No results found for: POTASSIUM  No results found for: CHLORIDE  No results found for: JAVIER  No  results found for: CO2  No results found for: BUN  No results found for: CR  No results found for: GLC  No results found for: WBC  No results found for: RBC  No results found for: HGB  No results found for: HCT  No results found for: MCV  No results found for: MCH  No results found for: MCHC  No results found for: RDW  No results found for: PLT    Thank you for allowing me to participate in the care of your patient.    Sincerely,     Farshad Pereira MD     [facility]

## 2017-10-11 ENCOUNTER — HOSPITAL ENCOUNTER (OUTPATIENT)
Dept: MAMMOGRAPHY | Facility: CLINIC | Age: 82
Discharge: HOME | End: 2017-10-11
Attending: FAMILY MEDICINE | Admitting: FAMILY MEDICINE
Payer: COMMERCIAL

## 2017-10-11 DIAGNOSIS — Z12.31 VISIT FOR SCREENING MAMMOGRAM: ICD-10-CM

## 2017-10-11 PROCEDURE — 77063 BREAST TOMOSYNTHESIS BI: CPT

## 2018-06-18 ENCOUNTER — TELEPHONE (OUTPATIENT)
Dept: CARDIOLOGY | Facility: CLINIC | Age: 83
End: 2018-06-18

## 2018-06-18 DIAGNOSIS — I87.2 VENOUS (PERIPHERAL) INSUFFICIENCY: Primary | ICD-10-CM

## 2018-06-18 NOTE — TELEPHONE ENCOUNTER
Pt called stating that last year she spoke with Lizet TYLER and Dr. Pereira regarding a venous ablation. Pt had decided then to wait but now she would like to discuss getting the ablation done. Pt stated that she is wearing her compression stockings everyday while awake but by the end of the day she is experiencing pain, discomfort and swelling. Pt stated she tried magnesium spray but that did not seem to help.      8/7/18 OV with Dr. Pereira   1. 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 She is happy to know that a venous ablation with sclerotherapy would be a good option for her if symptoms progress.    Pt had venous comp done 7/19/17. Tried to call Ashlie from financial office to see if patient would need to repeat this for insurance purposes. No answer. Left VM to call team 4 back.     Spoke to patient and scheduled to see Lizet TYLER 6/22/18 to discuss ablation. Will wait to hear back from financial office regarding testing.

## 2018-06-18 NOTE — TELEPHONE ENCOUNTER
Spoke to Ashlie from financial office and she stated that the venous comp will have to be within 1 year of when the EVLA and sclero get done. Last Venous comp 7/19/17. EVLA will need to be scheduled after 7/19/18 as there are no openings prior.     Will route to Dr. Pereira to see if he would like to order new venous comp prior to OV 6/22/18 with Lizet TYLER.

## 2018-06-19 NOTE — TELEPHONE ENCOUNTER
Spoke to patient and informed her that we will need her to get a right US venous comp. Order placed. Pt agreed to plan. Transferred to scheduling.

## 2018-06-26 ENCOUNTER — RADIANT APPOINTMENT (OUTPATIENT)
Dept: VASCULAR ULTRASOUND | Facility: CLINIC | Age: 83
End: 2018-06-26
Attending: INTERNAL MEDICINE
Payer: COMMERCIAL

## 2018-06-26 DIAGNOSIS — I87.2 VENOUS (PERIPHERAL) INSUFFICIENCY: ICD-10-CM

## 2018-06-26 PROCEDURE — 93971 EXTREMITY STUDY: CPT | Mod: RT | Performed by: INTERNAL MEDICINE

## 2018-07-05 ENCOUNTER — OFFICE VISIT (OUTPATIENT)
Dept: CARDIOLOGY | Facility: CLINIC | Age: 83
End: 2018-07-05
Attending: INTERNAL MEDICINE
Payer: COMMERCIAL

## 2018-07-05 VITALS
HEIGHT: 64 IN | BODY MASS INDEX: 26.46 KG/M2 | WEIGHT: 155 LBS | HEART RATE: 82 BPM | SYSTOLIC BLOOD PRESSURE: 166 MMHG | DIASTOLIC BLOOD PRESSURE: 75 MMHG

## 2018-07-05 DIAGNOSIS — R60.9 EDEMA, UNSPECIFIED TYPE: ICD-10-CM

## 2018-07-05 DIAGNOSIS — I87.2 VENOUS (PERIPHERAL) INSUFFICIENCY: Primary | ICD-10-CM

## 2018-07-05 DIAGNOSIS — I49.3 PVC'S (PREMATURE VENTRICULAR CONTRACTIONS): ICD-10-CM

## 2018-07-05 PROCEDURE — 99214 OFFICE O/P EST MOD 30 MIN: CPT | Performed by: NURSE PRACTITIONER

## 2018-07-05 NOTE — MR AVS SNAPSHOT
After Visit Summary   7/5/2018    Mandy Jack    MRN: [id number]           Patient Information     Date Of Birth          10/2/1933        Visit Information        Provider Department      7/5/2018 3:50 PM Bertha Erazo APRN CNP [facility]        Today's Diagnoses     Venous (peripheral) insufficiency    -  1    Edema, unspecified type        PVC's (premature ventricular contractions)           Follow-ups after your visit        Additional Services     Follow-Up with Cardiologist                 Your next 10 appointments already scheduled     Oct 08, 2018  9:00 AM CDT   US ENDOVENOUS ABLATION THERAPY INCOMPETENT VEIN RT, 2OR>VEINS with SUVUS1   [facility] HEART AT [place] ([facility])    [facility] [address] [address] [phone]            Oct 10, 2018  9:00 AM CDT   US LOWER EXTREMITY VENOUS DUPLEX RIGHT with SUVUS1   [facility] ([facility])    [address] [place] [id number] [phone]           Please bring a list of your medicines (including vitamins, minerals and over-the-counter drugs). Also, tell your doctor about any allergies you may have. Wear comfortable clothes and leave your valuables at home.  You do not need to do anything special to prepare for your exam.  Please call the Imaging Department at your exam site with any questions.            Oct 30, 2018  1:45 PM CDT   Return Visit with Farshad Pereira MD   [facility] ([facility])    [address] [place] [id number] [phone] OPT 2              Future tests that were ordered for you today     Open Future Orders        Priority Expected Expires Ordered    Follow-Up with Cardiologist Routine 10/26/2018 7/5/2019 7/5/2018    US Endovenous Ablat Thpy Incmpnt Vn Rt, 2Vn  "Routine 7/12/2018 7/5/2019 7/5/2018    US Lower Extremity Venous Duplex Right Routine 7/14/2018 7/5/2019 7/5/2018            Who to contact     If you have questions or need follow up information about today's clinic visit or your schedule please contact [facility]   [place] directly at [phone].  Normal or non-critical lab and imaging results will be communicated to you by MyChart, letter or phone within 4 business days after the clinic has received the results. If you do not hear from us within 7 days, please contact the clinic through MyChart or phone. If you have a critical or abnormal lab result, we will notify you by phone as soon as possible.  Submit refill requests through [employer] or call your pharmacy and they will forward the refill request to us. Please allow 3 business days for your refill to be completed.          Additional Information About Your Visit        Care EveryWhere ID     This is your Care EveryWhere ID. This could be used by other organizations to access your [place] medical records  [id number]        Your Vitals Were     Pulse Height BMI (Body Mass Index)             82 1.626 m (5' 4\") 26.61 kg/m2          Blood Pressure from Last 3 Encounters:   07/05/18 166/75   08/07/17 162/80   06/14/17 158/85    Weight from Last 3 Encounters:   07/05/18 70.3 kg (155 lb)   08/07/17 68.6 kg (151 lb 3.2 oz)   06/14/17 69.5 kg (153 lb 3.2 oz)              We Performed the Following     Follow-Up with Cardiac Advanced Practice Provider        Primary Care Provider Office Phone # Fax #    Vincenzo Hassan [phone]       [facility] [address]  [place] [address]        Equal Access to Services     NOEL GREEN : Matthew Holcomb, hola dolan, rica kaalmajackie knox, heidi avalos. So [facility] [phone].    ATENCIÓN: Si habla español, tiene a choi disposición servicios gratuitos de asistencia lingüística. " Peggy gregorio [phone].    We comply with applicable federal civil rights laws and [place] laws. We do not discriminate on the basis of race, color, national origin, age, disability, sex, sexual orientation, or gender identity.            Thank you!     Thank you for choosing [facility] HEART [facility]  for your care. Our goal is always to provide you with excellent care. Hearing back from our patients is one way we can continue to improve our services. Please take a few minutes to complete the written survey that you may receive in the mail after your visit with us. Thank you!             Your Updated Medication List - Protect others around you: Learn how to safely use, store and throw away your medicines at www.disposemymeds.org.          This list is accurate as of 7/5/18  5:31 PM.  Always use your most recent med list.                   Brand Name Dispense Instructions for use Diagnosis    ASPIRIN PO      Take 81 mg by mouth daily        CALTRATE PLUS OR      Take by mouth daily 600 mg- 400 units        desonide 0.05 % cream    DESOWEN    60 g    Apply sparingly to affected area twice daily.        hydrochlorothiazide 12.5 MG capsule    MICROZIDE     Take 12.5 mg by mouth every morning        losartan 50 MG tablet    COZAAR     Take 50 mg by mouth daily        magnesium 250 MG tablet      Take 1 tablet by mouth daily        metoprolol succinate 50 MG 24 hr tablet    TOPROL-XL     Take 50 mg by mouth        multivitamin, therapeutic Tabs tablet      Take 1 tablet by mouth daily        simvastatin 20 MG tablet    ZOCOR     Take by mouth At Bedtime        spironolactone 25 MG tablet    ALDACTONE     Take 12.5 mg by mouth

## 2018-07-05 NOTE — PROGRESS NOTES
HPI and Plan:   I had the pleasure of seeing Mandy Jack today in cardiology clinic follow up. She is a pleasant 84 year old patient of Dr. Pereira and Dr. Ngo. She has a history of hypertension, hyperlipidemia and diverticulitis.      She saw Dr. Ngo a year ago for evaluation of her PVCs.  Her echocardiogram showed normal LV systolic function.  A 48-hour Holter monitor showed a minimum heart rate of 72, a maximum of 104 and average of 64 beats per minute with no pauses noted greater than 2 seconds.  She was on 50 mg of Toprol-XL daily when she wore this.  Her primary rhythm was sinus with frequent PVCs.  She had over 20,000 ventricular ectopics in the 48 hours that she wore it.  She marked the event monitor 4 times and was noted to have frequent PVCs during those times.  Because her LV function was normal, Dr. Ngo made no further changes.  She did have a complaint, when she saw him, of some claudication-type symptoms that had apparently gotten worse since she was spending time with her  in the hospital and not as active as normal.  Exercise ABIs showed no hemodynamically significant obstructive peripheral arterial disease.    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  She has been wearing compression stockings now for over a year and finds that they give her a lot of relief.  Initially she did not want to receive with any procedures and she had other things going on with her family.  She more recently called our office complaining of the nocturnal cramping and wondered if she could  be seen again for her venous insufficiency.    Mrs. 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  Dr. Pereira felt she would be a good candidate for RF ablation of the right great saphenous vein plus scleral and limited phlebectomy  of the calf varicosities.  Mrs. Jack said she has been trying magnesium, but the high dose gave her diarrhea.  She is now on the lower over-the-counter dose and is using a magnesium spray.  Despite this she continues to have symptoms particularly at night.  Sometimes she has cramping in her right lower foot that can persist throughout the day, she continues to have good pedal pulses.  She brings with her her home blood pressure readings which show her blood pressure to be quite well controlled  with systolic readings in the 111-140.  She does carry the diagnosis of white coat hypertension and is hypertensive by my check.  She says that she recently calibrated her home blood pressure monitor with her primary care clinic monitor and they were accurate, leading me to believe her blood pressure is controlled.    Physical Exam  Please see Below     Assessment and Plan  1.  Venous insufficiency.  Venous competency studies done over a year ago demonstrated significant reflux of the right GS V.  Since then she has had success with compression stockings, 20-30 mmHg.  More recently her symptoms have gotten worse and since her life is a little bit easier now that her  is out of the hospital, she wanted to readdress this.  Repeat venous competency studies confirm she still has reflux.  The right great saphenous vein measured 9.2 mm from the junction to 6.5 mm of the distal calf with significant reflux from the junction to the ankle.  She also has extensive varicosities seen below the right knee posteriorly along with a refluxing perforating vein.  Dr. Pereira is already reviewed her study and feels to be a good candidate for venous seal or an RF ablation of the right GS V.  She will likely need scleral and limited phlebectomy of the calf varicosities.  Right now we were booked out until October, she is scheduling it for that time.  The meantime she will continue to wear compression therapy.  2. PVCs.  These are not  particularly symptomatic right now.  Her echocardiogram a year ago showed normal LV function.  She continues on Toprol-XL 50 mg daily.  At this point she would like to follow-up with Dr. Pereira for her PVCs.  3 Hypertension. Her blood pressure is elevated in clinic, I do believe she has whitecoat hypertension based on her home readings and the fact that her blood pressure monitor was calibrated.  Her home readings show blood pressure systolically in the 111's to 130s.  She continues on hydrochlorothiazide 12.5 mg, losartan 50, Toprol-XL 50 and spironolactone 12.5.      Thank you for allowing me to care for Mandy Jack today.    SHEILA Roche, CNP  Cardiology    Voice recognition software was used for this note, I have reviewed this note, but errors may have been missed.    Orders Placed This Encounter   Procedures     US Endovenous Ablat Thpy Incmpnt Vn Rt, 2Vn     US Lower Extremity Venous Duplex Right     Follow-Up with Cardiologist     No orders of the defined types were placed in this encounter.    Medications Discontinued During This Encounter   Medication Reason     Ibuprofen (ADVIL PO) Stopped by Patient         CURRENT MEDICATIONS:  Current Outpatient Prescriptions   Medication Sig Dispense Refill     ASPIRIN PO Take 81 mg by mouth daily       Calcium Carbonate-Vit D-Min (CALTRATE PLUS OR) Take by mouth daily 600 mg- 400 units       hydrochlorothiazide (MICROZIDE) 12.5 MG capsule Take 12.5 mg by mouth every morning       losartan (COZAAR) 50 MG tablet Take 50 mg by mouth daily       magnesium 250 MG tablet Take 1 tablet by mouth daily       metoprolol (TOPROL-XL) 50 MG 24 hr tablet Take 50 mg by mouth       multivitamin, therapeutic (THERA-VIT) TABS Take 1 tablet by mouth daily       simvastatin (ZOCOR) 20 MG tablet Take by mouth At Bedtime       spironolactone (ALDACTONE) 25 MG tablet Take 12.5 mg by mouth       desonide (DESOWEN) 0.05 % cream Apply sparingly to affected area twice daily. 60 g 0        ALLERGIES     Allergies   Allergen Reactions     Chlorthalidone      Fentanyl      Low HR during cataract surgery     Meperidine      Nitrofurantoin      Septra [Sulfamethoxazole W/Trimethoprim]      Tolterodine        PAST MEDICAL HISTORY:  Past Medical History:   Diagnosis Date     Degeneration of cervical intervertebral disc      Hypertension 12/20/2016     Mixed hyperlipidemia      Other chronic nonalcoholic liver disease     steatosis of liver     Other premature beats     symptomatic PVCS     Recurrent cystitis      Rosacea     rosacea conjunctivitis     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       PAST SURGICAL HISTORY:  Past Surgical History:   Procedure Laterality Date     GENITOURINARY SURGERY      bladder suspension     GYN SURGERY      total abdominal hysterectomy     GYN SURGERY      oophorectomy     PHACOEMULSIFICATION CLEAR CORNEA WITH TORIC INTRAOCULAR LENS IMPLANT Left 5/20/2015    Procedure: PHACOEMULSIFICATION CLEAR CORNEA WITH TORIC INTRAOCULAR LENS IMPLANT;  Surgeon: Marquise Hung MD;  Location: [facility]     PHACOEMULSIFICATION CLEAR CORNEA WITH TORIC INTRAOCULAR LENS IMPLANT Right 6/10/2015    Procedure: PHACOEMULSIFICATION CLEAR CORNEA WITH TORIC INTRAOCULAR LENS IMPLANT;  Surgeon: Marquise Hung MD;  Location: [facility]       FAMILY HISTORY:  Family History   Problem Relation Age of Onset     Diabetes Mother        SOCIAL HISTORY:  Social History     Social History     Marital status:      Spouse name: N/A     Number of children: N/A     Years of education: N/A     Social History Main Topics     Smoking status: Never Smoker     Smokeless tobacco: Never Used     Alcohol use Yes     Drug use: None     Sexual activity: Not Asked     Other Topics Concern     None     Social History Narrative       Review of Systems:  Skin:  Negative       Eyes:  Positive for glasses    ENT:  Negative      Respiratory:  Negative       Cardiovascular:  Negative      Gastroenterology: Negative      Genitourinary:  not  "assessed      Musculoskeletal:  Positive for nocturnal cramping    Neurologic:  Negative      Psychiatric:  Negative      Heme/Lymph/Imm:  Negative      Endocrine:  Negative        Physical Exam:  Vitals: /75  Pulse 82  Ht 1.626 m (5' 4\")  Wt 70.3 kg (155 lb)  BMI 26.61 kg/m2    Constitutional:  cooperative;in no acute distress        Skin:  warm and dry to the touch          Head:  normocephalic        Eyes:  pupils equal and round, conjunctivae and lids unremarkable, sclera white, no xanthalasma, EOMS intact, no nystagmus        Lymph:      ENT:  no pallor or cyanosis        Neck:  JVP normal        Respiratory:  normal breath sounds, clear to auscultation, normal A-P diameter, normal symmetry, normal respiratory excursion, no use of accessory muscles         Cardiac: regular rhythm;normal S1 and S2                pulses full and equal                                        GI:      benign    Extremities and Muscular Skeletal:  no edema   telangiectasia;varicose vein     C4, R>L    Neurological:  no gross motor deficits;affect appropriate        Psych:  Alert and Oriented x 3    Encounter Diagnoses   Name Primary?     Edema, unspecified type      PVC's (premature ventricular contractions)      Venous (peripheral) insufficiency Yes       Recent Lab Results:  LIPID RESULTS:  No results found for: CHOL, HDL, LDL, TRIG, CHOLHDLRATIO    LIVER ENZYME RESULTS:  No results found for: AST, ALT    CBC RESULTS:  No results found for: WBC, RBC, HGB, HCT, MCV, MCH, MCHC, RDW, PLT    BMP RESULTS:  No results found for: NA, POTASSIUM, CHLORIDE, CO2, ANIONGAP, GLC, BUN, CR, GFRESTIMATED, GFRESTBLACK, JAVIER     A1C RESULTS:  No results found for: A1C    INR RESULTS:  No results found for: INR        CC  Farshad Pereira MD  [address]  [place] [id number]                  "

## 2018-07-05 NOTE — LETTER
7/5/2018    Vincenzo Hassan MD  [employer] [facility] [address]  [place] [address]    RE: Mandy Raymondon       Dear Colleague,    I had the pleasure of seeing Mandy Jack in the [facility].    HPI and Plan:   I had the pleasure of seeing Mandy Jack today in cardiology clinic follow up. She is a pleasant 84 year old patient of Dr. Pereira and Dr. Ngo. She has a history of hypertension, hyperlipidemia and diverticulitis.      She saw Dr. Ngo a year ago for evaluation of her PVCs.  Her echocardiogram showed normal LV systolic function.  A 48-hour Holter monitor showed a minimum heart rate of 72, a maximum of 104 and average of 64 beats per minute with no pauses noted greater than 2 seconds.  She was on 50 mg of Toprol-XL daily when she wore this.  Her primary rhythm was sinus with frequent PVCs.  She had over 20,000 ventricular ectopics in the 48 hours that she wore it.  She marked the event monitor 4 times and was noted to have frequent PVCs during those times.  Because her LV function was normal, Dr. Ngo made no further changes.  She did have a complaint, when she saw him, of some claudication-type symptoms that had apparently gotten worse since she was spending time with her  in the hospital and not as active as normal.   Exercise ABIs showed no hemodynamically significant obstructive peripheral arterial disease.     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  She has been wearing compression stockings now for over a year and finds that they give her a lot of relief.  Initially she did not want to receive with any procedures and she had other things going on with her family.  She more recently called our office complaining of the nocturnal cramping and wondered if she could  be seen again for her venous insufficiency.    Mrs. Jack underwent repeat venous competency studies on June 26 which demonstrate significant reflux from the junction to the  ankle and extensive varicosities below the right knee posteriorly along the refluxing  vein.  Dr. Pereira felt she would be a good candidate for RF ablation of the right great saphenous vein plus scleral and limited phlebectomy of the calf varicosities.  Mrs. Jack said she has been trying magnesium, but the high dose gave her diarrhea.  She is now on the lower over-the-counter dose and is using a magnesium spray.  Despite this she continues to have symptoms particularly at night.  Sometimes she has cramping in her right lower foot that can persist throughout the day, she continues to have good pedal pulses.  She brings with her her home blood pressure readings which show her blood pressure to be quite well controlled  with systolic readings in the 111-140.  She does carry the diagnosis of white coat hypertension and is hypertensive by my check.  She says that she recently calibrated her home blood pressure monitor with her primary care clinic monitor and they were accurate, leading me to believe her blood pressure is controlled.    Physical Exam  Please see Below     Assessment and Plan  1.  Venous insufficiency.  Venous competency studies done over a year ago demonstrated significant reflux of the right GS V.  Since then she has had success with compression stockings, 20-30 mmHg.  More recently her symptoms have gotten worse and since her life is a little bit easier now that her  is out of the hospital, she wanted to readdress this.  Repeat venous competency studies confirm she still has reflux.  The right great saphenous vein measured 9.2 mm from the junction to 6.5 mm of the distal calf with significant reflux from the junction to the ankle.  She also has extensive varicosities seen below the right knee posteriorly along with a refluxing perforating vein.  Dr. Pereira is already reviewed her study and feels to be a good candidate for venous seal or an RF ablation of the right GS V.  She will likely  need scleral and limited phlebectomy of the calf varicosities.  Right now we were booked out until October, she is scheduling it for that time.  The meantime she will continue to wear compression therapy.  2. PVCs.  These are not particularly symptomatic right now.  Her echocardiogram a year ago showed normal LV function.  She continues on Toprol-XL 50 mg daily.  At this point she would like to follow-up with Dr. Pereira for her PVCs.  3 Hypertension. Her blood pressure is elevated in clinic, I do believe she has whitecoat hypertension based on her home readings and the fact that her blood pressure monitor was calibrated.  Her home readings show blood pressure systolically in the 111's to 130s.  She continues on hydrochlorothiazide 12.5 mg, losartan 50, Toprol-XL 50 and spironolactone 12.5.      Thank you for allowing me to care for Mandy Jack today.    SHEILA Roche, CNP  Cardiology    Voice recognition software was used for this note, I have reviewed this note, but errors may have been missed.    Orders Placed This Encounter   Procedures     US Endovenous Ablat Thpy Incmpnt Vn Rt, 2Vn     US Lower Extremity Venous Duplex Right     Follow-Up with Cardiologist     No orders of the defined types were placed in this encounter.    Medications Discontinued During This Encounter   Medication Reason     Ibuprofen (ADVIL PO) Stopped by Patient         CURRENT MEDICATIONS:  Current Outpatient Prescriptions   Medication Sig Dispense Refill     ASPIRIN PO Take 81 mg by mouth daily       Calcium Carbonate-Vit D-Min (CALTRATE PLUS OR) Take by mouth daily 600 mg- 400 units       hydrochlorothiazide (MICROZIDE) 12.5 MG capsule Take 12.5 mg by mouth every morning       losartan (COZAAR) 50 MG tablet Take 50 mg by mouth daily       magnesium 250 MG tablet Take 1 tablet by mouth daily       metoprolol (TOPROL-XL) 50 MG 24 hr tablet Take 50 mg by mouth       multivitamin, therapeutic (THERA-VIT) TABS Take 1 tablet by mouth  daily       simvastatin (ZOCOR) 20 MG tablet Take by mouth At Bedtime       spironolactone (ALDACTONE) 25 MG tablet Take 12.5 mg by mouth       desonide (DESOWEN) 0.05 % cream Apply sparingly to affected area twice daily. 60 g 0       ALLERGIES     Allergies   Allergen Reactions     Chlorthalidone      Fentanyl      Low HR during cataract surgery     Meperidine      Nitrofurantoin      Septra [Sulfamethoxazole W/Trimethoprim]      Tolterodine        PAST MEDICAL HISTORY:  Past Medical History:   Diagnosis Date     Degeneration of cervical intervertebral disc      Hypertension 12/20/2016     Mixed hyperlipidemia      Other chronic nonalcoholic liver disease     steatosis of liver     Other premature beats     symptomatic PVCS     Recurrent cystitis      Rosacea     rosacea conjunctivitis     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       PAST SURGICAL HISTORY:  Past Surgical History:   Procedure Laterality Date     GENITOURINARY SURGERY      bladder suspension     GYN SURGERY      total abdominal hysterectomy     GYN SURGERY      oophorectomy     PHACOEMULSIFICATION CLEAR CORNEA WITH TORIC INTRAOCULAR LENS IMPLANT Left 5/20/2015    Procedure: PHACOEMULSIFICATION CLEAR CORNEA WITH TORIC INTRAOCULAR LENS IMPLANT;  Surgeon: Marquise Hung MD;  Location: [facility]     PHACOEMULSIFICATION CLEAR CORNEA WITH TORIC INTRAOCULAR LENS IMPLANT Right 6/10/2015    Procedure: PHACOEMULSIFICATION CLEAR CORNEA WITH TORIC INTRAOCULAR LENS IMPLANT;  Surgeon: Marquise Hung MD;  Location: [facility]       FAMILY HISTORY:  Family History   Problem Relation Age of Onset     Diabetes Mother        SOCIAL HISTORY:  Social History     Social History     Marital status:      Spouse name: N/A     Number of children: N/A     Years of education: N/A     Social History Main Topics     Smoking status: Never Smoker     Smokeless tobacco: Never Used     Alcohol use Yes     Drug use: None     Sexual activity: Not Asked     Other Topics Concern     None     Social  "History Narrative       Review of Systems:  Skin:  Negative       Eyes:  Positive for glasses    ENT:  Negative      Respiratory:  Negative       Cardiovascular:  Negative      Gastroenterology: Negative      Genitourinary:  not assessed      Musculoskeletal:  Positive for nocturnal cramping    Neurologic:  Negative      Psychiatric:  Negative      Heme/Lymph/Imm:  Negative      Endocrine:  Negative        Physical Exam:  Vitals: /75  Pulse 82  Ht 1.626 m (5' 4\")  Wt 70.3 kg (155 lb)  BMI 26.61 kg/m2    Constitutional:  cooperative;in no acute distress        Skin:  warm and dry to the touch          Head:  normocephalic        Eyes:  pupils equal and round, conjunctivae and lids unremarkable, sclera white, no xanthalasma, EOMS intact, no nystagmus        Lymph:      ENT:  no pallor or cyanosis        Neck:  JVP normal        Respiratory:  normal breath sounds, clear to auscultation, normal A-P diameter, normal symmetry, normal respiratory excursion, no use of accessory muscles         Cardiac: regular rhythm;normal S1 and S2                pulses full and equal                                        GI:      benign    Extremities and Muscular Skeletal:  no edema   telangiectasia;varicose vein     C4, R>L    Neurological:  no gross motor deficits;affect appropriate        Psych:  Alert and Oriented x 3    Encounter Diagnoses   Name Primary?     Edema, unspecified type      PVC's (premature ventricular contractions)      Venous (peripheral) insufficiency Yes       Recent Lab Results:  LIPID RESULTS:  No results found for: CHOL, HDL, LDL, TRIG, CHOLHDLRATIO    LIVER ENZYME RESULTS:  No results found for: AST, ALT    CBC RESULTS:  No results found for: WBC, RBC, HGB, HCT, MCV, MCH, MCHC, RDW, PLT    BMP RESULTS:  No results found for: NA, POTASSIUM, CHLORIDE, CO2, ANIONGAP, GLC, BUN, CR, GFRESTIMATED, GFRESTBLACK, JAVIER     A1C RESULTS:  No results found for: A1C    INR RESULTS:  No results found for: " INR          Thank you for allowing me to participate in the care of your patient.    Sincerely,     SHEILA Fuchs [facility]

## 2018-10-04 ENCOUNTER — TELEPHONE (OUTPATIENT)
Dept: CARDIOLOGY | Facility: CLINIC | Age: 83
End: 2018-10-04

## 2018-10-04 DIAGNOSIS — Z98.890 STATUS POST ENDOVENOUS RADIOFREQUENCY ABLATION (RFA) OF SAPHENOUS VEIN: Primary | ICD-10-CM

## 2018-10-04 DIAGNOSIS — I83.891 VARICOSE VEINS OF RIGHT LOWER EXTREMITY WITH OTHER COMPLICATIONS: Primary | ICD-10-CM

## 2018-10-04 RX ORDER — DIAZEPAM 5 MG
5 TABLET ORAL SEE ADMIN INSTRUCTIONS
Qty: 2 TABLET | Refills: 0 | Status: SHIPPED | OUTPATIENT
Start: 2018-10-04 | End: 2018-10-30

## 2018-10-08 ENCOUNTER — RADIANT APPOINTMENT (OUTPATIENT)
Dept: VASCULAR ULTRASOUND | Facility: CLINIC | Age: 83
End: 2018-10-08
Attending: NURSE PRACTITIONER
Payer: COMMERCIAL

## 2018-10-08 VITALS
RESPIRATION RATE: 12 BRPM | HEART RATE: 68 BPM | OXYGEN SATURATION: 97 % | DIASTOLIC BLOOD PRESSURE: 82 MMHG | SYSTOLIC BLOOD PRESSURE: 134 MMHG

## 2018-10-08 DIAGNOSIS — I87.2 VENOUS INSUFFICIENCY: ICD-10-CM

## 2018-10-08 DIAGNOSIS — R60.9 EDEMA, UNSPECIFIED TYPE: ICD-10-CM

## 2018-10-08 DIAGNOSIS — I87.2 VENOUS (PERIPHERAL) INSUFFICIENCY: ICD-10-CM

## 2018-10-08 DIAGNOSIS — I83.899 VARICOSE VEINS OF LOWER EXTREMITIES WITH COMPLICATIONS: Primary | ICD-10-CM

## 2018-10-08 PROCEDURE — 36475 ENDOVENOUS RF 1ST VEIN: CPT | Mod: RT | Performed by: INTERNAL MEDICINE

## 2018-10-08 PROCEDURE — 36470 NJX SCLRSNT 1 INCMPTNT VEIN: CPT | Mod: RT | Performed by: INTERNAL MEDICINE

## 2018-10-08 PROCEDURE — 36482 ENDOVEN THER CHEM ADHES 1ST: CPT | Mod: 59 | Performed by: INTERNAL MEDICINE

## 2018-10-08 NOTE — PROGRESS NOTES
VSS. Patient tolerated procedure well. discharge instructions/post procedure care reviewed with patient, patient's daughter, and patient's . Follow up apts confirmed with patient and family.

## 2018-10-09 ENCOUNTER — TELEPHONE (OUTPATIENT)
Dept: CARDIOLOGY | Facility: CLINIC | Age: 83
End: 2018-10-09

## 2018-10-09 NOTE — TELEPHONE ENCOUNTER
Spoke with patient. Reviewed Dr. Pereira's comments and recommendations with the patient. Patient verbalized understanding and agreed with plan of care.

## 2018-10-09 NOTE — TELEPHONE ENCOUNTER
Numbness, I think is from the wrap. If tenderness and swelling get worse today, please let us know.  We will be imaging the leg tomorrow to check it out.

## 2018-10-09 NOTE — TELEPHONE ENCOUNTER
WILY from patient, stating that she is experiencing some leg swelling and tenderness since her venous procedure yesterday. Patient states that her pain has improved with tylenol, but her leg is hard and tender and she had numbness in her leg and ankle when she isn't walking around. Patient had VenaSeal and sclerotherapy of the R) leg done on 10/8/18 with Dr. Pereira. Spoke with patient. Patient states that she isn't having any more pain after taking tylenol, but is concerned about the swelling in the leg and toes. Patient states that she experiences numbness in her R) foot when she elevates her leg. Patient states that the numbness subsides when she walks around. Patient denies any discoloration in her R) toes, patient states they are warm, pink, and she is able to wiggle her toes. Patient also complained of the ace wrap being tight. Reviewed with patient that the bandage is intended to be tight to ensure adequate healing of her treated veins. Patient states the only thing she is really concerned about is the numbness when she is elevating her legs and would like Dr. Pereira to be notified about it. Will route to Dr. Pereira for review.

## 2018-10-10 ENCOUNTER — RADIANT APPOINTMENT (OUTPATIENT)
Dept: VASCULAR ULTRASOUND | Facility: CLINIC | Age: 83
End: 2018-10-10
Attending: NURSE PRACTITIONER
Payer: COMMERCIAL

## 2018-10-10 DIAGNOSIS — R60.9 EDEMA, UNSPECIFIED TYPE: ICD-10-CM

## 2018-10-10 DIAGNOSIS — I87.2 VENOUS (PERIPHERAL) INSUFFICIENCY: ICD-10-CM

## 2018-10-10 PROCEDURE — 93971 EXTREMITY STUDY: CPT | Mod: RT | Performed by: INTERNAL MEDICINE

## 2018-10-26 ENCOUNTER — HOSPITAL ENCOUNTER (OUTPATIENT)
Dept: MAMMOGRAPHY | Facility: CLINIC | Age: 83
Discharge: HOME | End: 2018-10-26
Attending: FAMILY MEDICINE | Admitting: FAMILY MEDICINE
Payer: COMMERCIAL

## 2018-10-26 DIAGNOSIS — Z12.31 VISIT FOR SCREENING MAMMOGRAM: ICD-10-CM

## 2018-10-26 PROCEDURE — 77067 SCR MAMMO BI INCL CAD: CPT

## 2018-10-30 ENCOUNTER — OFFICE VISIT (OUTPATIENT)
Dept: CARDIOLOGY | Facility: CLINIC | Age: 83
End: 2018-10-30
Attending: NURSE PRACTITIONER
Payer: COMMERCIAL

## 2018-10-30 VITALS
SYSTOLIC BLOOD PRESSURE: 170 MMHG | HEIGHT: 64 IN | HEART RATE: 74 BPM | DIASTOLIC BLOOD PRESSURE: 80 MMHG | WEIGHT: 155.2 LBS | BODY MASS INDEX: 26.5 KG/M2

## 2018-10-30 DIAGNOSIS — I49.3 PVC'S (PREMATURE VENTRICULAR CONTRACTIONS): ICD-10-CM

## 2018-10-30 DIAGNOSIS — I87.2 VENOUS (PERIPHERAL) INSUFFICIENCY: ICD-10-CM

## 2018-10-30 DIAGNOSIS — R07.89 ATYPICAL CHEST PAIN: Primary | ICD-10-CM

## 2018-10-30 PROCEDURE — 99214 OFFICE O/P EST MOD 30 MIN: CPT | Performed by: INTERNAL MEDICINE

## 2018-10-30 NOTE — PATIENT INSTRUCTIONS
To help with the blood pressure, I'd like you to take a full pill of the SPIRONOLACTONE (25mg daily). Check your blood pressure at home and if it comes down under 120, then go back to 1/2 pill.     Check your blood pressures mid-day so that your morning pills have time to work.    We will also check your heart arteries in case you were having heart pain from blockages. Will do that with a CT of the heart.    Please get some over the counter PRILOSEC (omeprazole) and take that for two weeks regardless of if your symptoms improve.    See Lizet back in one month.

## 2018-10-30 NOTE — LETTER
10/30/2018    Vincenzo Hassan MD  [employer] [address]  [place] [address]    RE: Mandy DREW Guero       Dear Colleague,    I had the pleasure of seeing Mandy H Guero in the [facility].    Cardiology Progress Note          Assessment and Plan:     1. Dyspnea, palpitations, frequent PVCs and atypical chest discomfort    We will do CT coronary angiogram looking for structural abnormality and rule out obstructive coronary artery disease.    She will follow-up with Lizet in 1 month to review testing.      2. 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  No current symptoms.      3. Hypertension, suboptimal control    Blood pressure from her outpatient primary care office was 138 systolic.  Will increase her Aldactone dose from 12.5 milligrams to 25 milligrams daily.  Could also consider increasing her losartan, but may need to recheck her potassium level in the interim if we do that.    Could also consider long-acting nitroglycerin.  Patient does not get headaches regularly.    This note was transcribed using electronic voice recognition software and there may be typographical errors present.                Interval History:   The patient is a very pleasant 85 year old whom I have seen previously in vein consultation.  She also has history of PVCs that she saw Dr. Ngo for.  The PVCs do not bother her.  2 nights ago, she was awoken from sleep with chest discomfort.  It has not happened with exertion.  She was concerned and wanted to talk about that today.    She brings in a prescription of her past medical history including being  63 years to her  Greg who has four-vessel bypass and ICD.  He is also a patient of our heart clinic.  She currently follows with Dr. Hassan at [facility] and has a good relationship with him.  She saw Javad in our clinic and likes her very much.    We did right lower  "extremity VenaSeal to the thigh that was very successful.  We also did sclerotherapy to the distal GS V on that leg to close down the  and venous varicosities.  This was also successful.  She has a small knot of clotted varicosity that is nontender to the medial aspect of her right calf.    She had some blistering from the tumescent and rapid, these have improved and pretty much resolved.  She has had echocardiogram without any significant structural abnormality.  She has not had ischemic evaluation.                     Review of Systems:   Review of Systems:  Skin:  Negative     Eyes:  Positive for glasses  ENT:  Negative    Respiratory:  Positive for shortness of breath  Cardiovascular:    Positive for;chest pain;palpitations  Gastroenterology: Negative    Genitourinary:  not assessed    Musculoskeletal:  Negative    Neurologic:  Negative    Psychiatric:  Negative    Heme/Lymph/Imm:  Positive for allergies  Endocrine:  Negative                Physical Exam:     Vitals: /80  Pulse 74  Ht 1.626 m (5' 4\")  Wt 70.4 kg (155 lb 3.2 oz)  BMI 26.64 kg/m2  Constitutional:  in no acute distress;cooperative, alert and oriented, well developed, well nourished, in no acute distress        Skin:  warm and dry to the touch, no apparent skin lesions or masses noted        Head:  normocephalic, no masses or lesions        Eyes:  pupils equal and round, conjunctivae and lids unremarkable, sclera white, no xanthalasma, EOMS intact, no nystagmus        ENT:  no pallor or cyanosis        Neck:  JVP normal        Chest:  normal breath sounds, clear to auscultation, normal A-P diameter, normal symmetry, normal respiratory excursion, no use of accessory muscles        Cardiac: regular rhythm;normal S1 and S2;no murmurs, gallops or rubs detected                  Abdomen:      benign    Extremities and Back:  no edema   Nontender thrombosed varicose vein from treatment medial side of right distal calf    Neurological:  " no gross motor deficits;affect appropriate                 Medications:     Current Outpatient Prescriptions   Medication Sig Dispense Refill     ASPIRIN PO Take 81 mg by mouth daily       Calcium Carbonate-Vit D-Min (CALTRATE PLUS OR) Take by mouth daily 600 mg- 400 units       desonide (DESOWEN) 0.05 % cream Apply sparingly to affected area twice daily. 60 g 0     hydrochlorothiazide (MICROZIDE) 12.5 MG capsule Take 12.5 mg by mouth every morning       losartan (COZAAR) 50 MG tablet Take 50 mg by mouth daily       magnesium 250 MG tablet Take 1 tablet by mouth daily       metoprolol (TOPROL-XL) 50 MG 24 hr tablet Take 50 mg by mouth       multivitamin, therapeutic (THERA-VIT) TABS Take 1 tablet by mouth daily       simvastatin (ZOCOR) 20 MG tablet Take by mouth At Bedtime       spironolactone (ALDACTONE) 25 MG tablet Take 12.5 mg by mouth             Thank you for allowing me to participate in the care of your patient.    Sincerely,     Farshad Pereira MD     [facility]

## 2018-10-30 NOTE — MR AVS SNAPSHOT
After Visit Summary   10/30/2018    Mandy Jack    MRN: [id number]           Patient Information     Date Of Birth          10/2/1933        Visit Information        Provider Department      10/30/2018 1:45 PM Farshad Pereira MD [facility]        Today's Diagnoses     Atypical chest pain    -  1    PVC's (premature ventricular contractions)        Venous (peripheral) insufficiency          Care Instructions    To help with the blood pressure, I'd like you to take a full pill of the SPIRONOLACTONE (25mg daily). Check your blood pressure at home and if it comes down under 120, then go back to 1/2 pill.     Check your blood pressures mid-day so that your morning pills have time to work.    We will also check your heart arteries in case you were having heart pain from blockages. Will do that with a CT of the heart.    Please get some over the counter PRILOSEC (omeprazole) and take that for two weeks regardless of if your symptoms improve.    See Lizet back in one month.          Follow-ups after your visit        Additional Services     Follow-Up with Cardiac Advanced Practice Provider                 Future tests that were ordered for you today     Open Future Orders        Priority Expected Expires Ordered    Follow-Up with Cardiac Advanced Practice Provider Routine 11/29/2018 10/30/2019 10/30/2018    CT Angiogram coronary artery Routine 10/31/2018 10/30/2019 10/30/2018            Who to contact     If you have questions or need follow up information about today's clinic visit or your schedule please contact [facility] directly at [phone].  Normal or non-critical lab and imaging results will be communicated to you by MyChart, letter or phone within 4 business days after the clinic has received the results. If you do not hear from us within 7 days, please contact the clinic through MyChart or phone. If you have a  "critical or abnormal lab result, we will notify you by phone as soon as possible.  Submit refill requests through [employer] or call your pharmacy and they will forward the refill request to us. Please allow 3 business days for your refill to be completed.          Additional Information About Your Visit        Care EveryWhere ID     This is your Care EveryWhere ID. This could be used by other organizations to access your [facility] medical records  [id number]        Your Vitals Were     Pulse Height BMI (Body Mass Index)             74 1.626 m (5' 4\") 26.64 kg/m2          Blood Pressure from Last 3 Encounters:   10/30/18 170/80   10/08/18 134/82   07/05/18 166/75    Weight from Last 3 Encounters:   10/30/18 70.4 kg (155 lb 3.2 oz)   07/05/18 70.3 kg (155 lb)   08/07/17 68.6 kg (151 lb 3.2 oz)              We Performed the Following     Follow-Up with Cardiologist        Primary Care Provider Office Phone # Fax #    Vincenzo MD Mo [phone]       [facility] [address] [facility] [address]        Equal Access to Services     [facility]: Hadii aad niki horowitzo Sokenia, waaxda lukaron, qaybta kaalmada adejessie, heidi olguin . So [facility] [phone].    ATENCIÓN: Si habla español, tiene a choi disposición servicios gratuitos de asistencia lingüística. Llame al [phone].    We comply with applicable federal civil rights laws and [place] laws. We do not discriminate on the basis of race, color, national origin, age, disability, sex, sexual orientation, or gender identity.            Thank you!     Thank you for choosing [facility]  for your care. Our goal is always to provide you with excellent care. Hearing back from our patients is one way we can continue to improve our services. Please take a few minutes to complete the written survey that you may receive in the mail after your visit with us. Thank you!             Your Updated " Medication List - Protect others around you: Learn how to safely use, store and throw away your medicines at www.disposemymeds.org.          This list is accurate as of 10/30/18  2:24 PM.  Always use your most recent med list.                   Brand Name Dispense Instructions for use Diagnosis    ASPIRIN PO      Take 81 mg by mouth daily        CALTRATE PLUS OR      Take by mouth daily 600 mg- 400 units        desonide 0.05 % cream    DESOWEN    60 g    Apply sparingly to affected area twice daily.        hydrochlorothiazide 12.5 MG capsule    MICROZIDE     Take 12.5 mg by mouth every morning        losartan 50 MG tablet    COZAAR     Take 50 mg by mouth daily        magnesium 250 MG tablet      Take 1 tablet by mouth daily        metoprolol succinate 50 MG 24 hr tablet    TOPROL-XL     Take 50 mg by mouth        multivitamin, therapeutic Tabs tablet      Take 1 tablet by mouth daily        simvastatin 20 MG tablet    ZOCOR     Take by mouth At Bedtime        spironolactone 25 MG tablet    ALDACTONE     Take 12.5 mg by mouth

## 2018-11-02 ENCOUNTER — HOSPITAL ENCOUNTER (OUTPATIENT)
Dept: CARDIOLOGY | Facility: CLINIC | Age: 83
Discharge: HOME | End: 2018-11-02
Attending: INTERNAL MEDICINE | Admitting: INTERNAL MEDICINE
Payer: COMMERCIAL

## 2018-11-02 VITALS — SYSTOLIC BLOOD PRESSURE: 117 MMHG | HEART RATE: 62 BPM | DIASTOLIC BLOOD PRESSURE: 65 MMHG

## 2018-11-02 DIAGNOSIS — I87.2 VENOUS (PERIPHERAL) INSUFFICIENCY: ICD-10-CM

## 2018-11-02 DIAGNOSIS — I49.3 PVC'S (PREMATURE VENTRICULAR CONTRACTIONS): ICD-10-CM

## 2018-11-02 LAB
CREAT BLD-MCNC: 1 MG/DL (ref 0.52–1.04)
GFR SERPL CREATININE-BSD FRML MDRD: 53 ML/MIN/1.7M2

## 2018-11-02 PROCEDURE — 75574 CT ANGIO HRT W/3D IMAGE: CPT

## 2018-11-02 PROCEDURE — 25000125 ZZHC RX 250: Performed by: INTERNAL MEDICINE

## 2018-11-02 PROCEDURE — 25000128 H RX IP 250 OP 636: Performed by: INTERNAL MEDICINE

## 2018-11-02 PROCEDURE — 25000132 ZZH RX MED GY IP 250 OP 250 PS 637: Performed by: INTERNAL MEDICINE

## 2018-11-02 PROCEDURE — 82565 ASSAY OF CREATININE: CPT

## 2018-11-02 PROCEDURE — 75574 CT ANGIO HRT W/3D IMAGE: CPT | Mod: 26 | Performed by: INTERNAL MEDICINE

## 2018-11-02 RX ORDER — DIPHENHYDRAMINE HYDROCHLORIDE 50 MG/ML
25-50 INJECTION INTRAMUSCULAR; INTRAVENOUS
Status: DISCONTINUED | OUTPATIENT
Start: 2018-11-02 | End: 2018-11-03 | Stop reason: HOSPADM

## 2018-11-02 RX ORDER — ACYCLOVIR 200 MG/1
0-1 CAPSULE ORAL
Status: DISCONTINUED | OUTPATIENT
Start: 2018-11-02 | End: 2018-11-03 | Stop reason: HOSPADM

## 2018-11-02 RX ORDER — NITROGLYCERIN 0.4 MG/1
0.4 TABLET SUBLINGUAL
Status: COMPLETED | OUTPATIENT
Start: 2018-11-02 | End: 2018-11-02

## 2018-11-02 RX ORDER — ONDANSETRON 2 MG/ML
4 INJECTION INTRAMUSCULAR; INTRAVENOUS
Status: DISCONTINUED | OUTPATIENT
Start: 2018-11-02 | End: 2018-11-03 | Stop reason: HOSPADM

## 2018-11-02 RX ORDER — METOPROLOL TARTRATE 1 MG/ML
5-15 INJECTION, SOLUTION INTRAVENOUS
Status: DISCONTINUED | OUTPATIENT
Start: 2018-11-02 | End: 2018-11-03 | Stop reason: HOSPADM

## 2018-11-02 RX ORDER — METHYLPREDNISOLONE SODIUM SUCCINATE 125 MG/2ML
125 INJECTION, POWDER, LYOPHILIZED, FOR SOLUTION INTRAMUSCULAR; INTRAVENOUS
Status: DISCONTINUED | OUTPATIENT
Start: 2018-11-02 | End: 2018-11-03 | Stop reason: HOSPADM

## 2018-11-02 RX ORDER — METOPROLOL TARTRATE 50 MG
50-100 TABLET ORAL
Status: DISCONTINUED | OUTPATIENT
Start: 2018-11-02 | End: 2018-11-03 | Stop reason: HOSPADM

## 2018-11-02 RX ORDER — IOPAMIDOL 755 MG/ML
50-150 INJECTION, SOLUTION INTRAVASCULAR ONCE
Status: COMPLETED | OUTPATIENT
Start: 2018-11-02 | End: 2018-11-02

## 2018-11-02 RX ORDER — DIPHENHYDRAMINE HCL 25 MG
25 CAPSULE ORAL
Status: DISCONTINUED | OUTPATIENT
Start: 2018-11-02 | End: 2018-11-03 | Stop reason: HOSPADM

## 2018-11-02 RX ADMIN — METOPROLOL TARTRATE 5 MG: 5 INJECTION, SOLUTION INTRAVENOUS at 14:18

## 2018-11-02 RX ADMIN — METOPROLOL TARTRATE 10 MG: 5 INJECTION, SOLUTION INTRAVENOUS at 14:10

## 2018-11-02 RX ADMIN — METOPROLOL TARTRATE 10 MG: 5 INJECTION, SOLUTION INTRAVENOUS at 14:36

## 2018-11-02 RX ADMIN — METOPROLOL TARTRATE 10 MG: 5 INJECTION, SOLUTION INTRAVENOUS at 14:41

## 2018-11-02 RX ADMIN — NITROGLYCERIN 0.4 MG: 0.4 TABLET SUBLINGUAL at 14:32

## 2018-11-02 RX ADMIN — METOPROLOL TARTRATE 5 MG: 5 INJECTION, SOLUTION INTRAVENOUS at 14:46

## 2018-11-02 RX ADMIN — SODIUM CHLORIDE 250 ML: 9 INJECTION, SOLUTION INTRAVENOUS at 14:50

## 2018-11-02 RX ADMIN — NITROGLYCERIN 0.4 MG: 0.4 TABLET SUBLINGUAL at 14:15

## 2018-11-02 RX ADMIN — IOPAMIDOL 185 ML: 755 INJECTION, SOLUTION INTRAVENOUS at 14:51

## 2018-11-21 ENCOUNTER — TELEPHONE (OUTPATIENT)
Dept: CARDIOLOGY | Facility: CLINIC | Age: 83
End: 2018-11-21

## 2018-11-21 ENCOUNTER — OFFICE VISIT (OUTPATIENT)
Dept: CARDIOLOGY | Facility: CLINIC | Age: 83
End: 2018-11-21
Attending: INTERNAL MEDICINE
Payer: COMMERCIAL

## 2018-11-21 VITALS
SYSTOLIC BLOOD PRESSURE: 160 MMHG | BODY MASS INDEX: 26.44 KG/M2 | DIASTOLIC BLOOD PRESSURE: 70 MMHG | WEIGHT: 154.9 LBS | HEART RATE: 60 BPM | HEIGHT: 64 IN

## 2018-11-21 DIAGNOSIS — I10 BENIGN ESSENTIAL HYPERTENSION: Primary | ICD-10-CM

## 2018-11-21 DIAGNOSIS — I10 BENIGN ESSENTIAL HYPERTENSION: ICD-10-CM

## 2018-11-21 DIAGNOSIS — I49.3 PVC'S (PREMATURE VENTRICULAR CONTRACTIONS): ICD-10-CM

## 2018-11-21 DIAGNOSIS — I87.2 VENOUS (PERIPHERAL) INSUFFICIENCY: ICD-10-CM

## 2018-11-21 DIAGNOSIS — R07.89 ATYPICAL CHEST PAIN: ICD-10-CM

## 2018-11-21 LAB
ANION GAP SERPL CALCULATED.3IONS-SCNC: 10.1 MMOL/L (ref 6–17)
BUN SERPL-MCNC: 15 MG/DL (ref 7–30)
CALCIUM SERPL-MCNC: 10.5 MG/DL (ref 8.5–10.5)
CHLORIDE SERPL-SCNC: 99 MMOL/L (ref 98–107)
CO2 SERPL-SCNC: 26 MMOL/L (ref 23–29)
CREAT SERPL-MCNC: 1.06 MG/DL (ref 0.7–1.3)
GFR SERPL CREATININE-BSD FRML MDRD: 49 ML/MIN/1.7M2
GLUCOSE SERPL-MCNC: 104 MG/DL (ref 70–105)
POTASSIUM SERPL-SCNC: 4.1 MMOL/L (ref 3.5–5.1)
SODIUM SERPL-SCNC: 131 MMOL/L (ref 136–145)

## 2018-11-21 PROCEDURE — 99214 OFFICE O/P EST MOD 30 MIN: CPT | Performed by: NURSE PRACTITIONER

## 2018-11-21 PROCEDURE — 80048 BASIC METABOLIC PNL TOTAL CA: CPT | Performed by: NURSE PRACTITIONER

## 2018-11-21 PROCEDURE — 36415 COLL VENOUS BLD VENIPUNCTURE: CPT | Performed by: NURSE PRACTITIONER

## 2018-11-21 NOTE — LETTER
11/21/2018    Vincenzo Hassan MD  [employer] [facility] [address]  [place] [address]    RE: Mandy RUSSELL Guero       Dear Colleague,    I had the pleasure of seeing Mandy Jack in the [facility].    HPI and Plan:   I had the pleasure of seeing Mandy Jack today in cardiology clinic follow up. She is a pleasant 85 year old patient of Dr. Pereira.  She has a history of hypertension, hyperlipidemia, venous insufficiency status post venous seal and sclerotherapy to the right lower extremity and diverticulitis.  She also has PVCs treated with beta blockade.    She saw Dr. Pereira following her venous seal and was doing quite well.  She had a episode of chest discomfort and a CT coronary angiogram was done.  Her CT coronary angiogram showed no evidence of coronary artery calcification, she had widely patent coronary arteries without significant stenosis.  Her total calcium score was 0.  The radiology report was unremarkable.    During her venous heel procedure, she was hypertensive.  Her blood pressure is managed by her primary care doctor at [facility], Dr. Gerson Hassan.  Dr. Pereira recommended she increase her spironolactone which she did to 25 mg daily.  After her blood pressure fell down into the 120s and 130s, she stopped went back to the 12.5 mg tablet.  Today her blood pressure is elevated again at 160/70 by my nurses check and also by my own check.  She is on several antihypertensives including hydrochlorothiazide 12.5 mg, losartan 50, Toprol-XL 50 and spironolactone 12.5 mg.  Looking back through care everywhere her last BMP was in December 2017, at that time her  sodium was 136, potassium 4.3 and creatinine 1.05.  Overall today she feels great, no chest pain no shortness of breath PND or orthopnea.  She is very happy with the resolution of her varicosities.    Physical Exam  Please see Below     Assessment and Plan  1.  Venous insufficiency status post right lower extremity GS V and venous  varicosities treated with venous seal and sclerotherapy.  She has had symptom resolution.  She feels good when she wears her compression stockings and plans to continue to wear them.  2.  Hypertension.  Dr. Pereira recommended that she increase her Aldactone to 25 mg daily, she did briefly but she is back down to 12.5 mg daily.  Perhaps she misunderstood the instructions.  Looking at the blood pressures that she brought in her blood pressure was better controlled on the higher dose.  She is on multiple medications that affect her electrolytes and kidney function, I have asked her to check a BMP today before she leaves clinic.  If that looks okay and her potassium is not too high than I would have her go back to the 25 mg dose of Aldactone.  She has an appointment in 1 month with her primary, Dr. Hassan.  This appointment is her annual checkup and she assures me they will check labs that day.  If not then we will bring her back to be seen in our clinic.    3.  Atypical chest discomfort.  Her CT coronary angiogram looks excellent with a calcium score of 0 and only trivial soft plaque noted.    Thank you for allowing me to care for Mandy Jack today.    SHEILA Roche, CNP  Cardiology    Voice recognition software was used for this note, I have reviewed this note, but errors may have been missed.    Orders Placed This Encounter   Procedures     Basic metabolic panel     No orders of the defined types were placed in this encounter.    Medications Discontinued During This Encounter   Medication Reason     desonide (DESOWEN) 0.05 % cream          CURRENT MEDICATIONS:  Current Outpatient Prescriptions   Medication Sig Dispense Refill     ASPIRIN PO Take 81 mg by mouth daily       Calcium Carbonate-Vit D-Min (CALTRATE PLUS OR) Take by mouth daily 600 mg- 400 units       hydrochlorothiazide (MICROZIDE) 12.5 MG capsule Take 12.5 mg by mouth every morning       losartan (COZAAR) 50 MG tablet Take 50 mg by mouth daily        magnesium 250 MG tablet Take 1 tablet by mouth daily       metoprolol (TOPROL-XL) 50 MG 24 hr tablet Take 50 mg by mouth       multivitamin, therapeutic (THERA-VIT) TABS Take 1 tablet by mouth daily       simvastatin (ZOCOR) 20 MG tablet Take by mouth At Bedtime       spironolactone (ALDACTONE) 25 MG tablet Take 12.5 mg by mouth         ALLERGIES     Allergies   Allergen Reactions     Chlorthalidone      Fentanyl      Low HR during cataract surgery     Meperidine      Nitrofurantoin      Septra [Sulfamethoxazole W/Trimethoprim]      Tolterodine        PAST MEDICAL HISTORY:  Past Medical History:   Diagnosis Date     Degeneration of cervical intervertebral disc      Hypertension 12/20/2016     Mixed hyperlipidemia      Other chronic nonalcoholic liver disease     steatosis of liver     Other premature beats     symptomatic PVCS     Recurrent cystitis      Rosacea     rosacea conjunctivitis     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       PAST SURGICAL HISTORY:  Past Surgical History:   Procedure Laterality Date     GENITOURINARY SURGERY      bladder suspension     GYN SURGERY      total abdominal hysterectomy     GYN SURGERY      oophorectomy     PHACOEMULSIFICATION CLEAR CORNEA WITH TORIC INTRAOCULAR LENS IMPLANT Left 5/20/2015    Procedure: PHACOEMULSIFICATION CLEAR CORNEA WITH TORIC INTRAOCULAR LENS IMPLANT;  Surgeon: Marquise Hung MD;  Location: [facility]     PHACOEMULSIFICATION CLEAR CORNEA WITH TORIC INTRAOCULAR LENS IMPLANT Right 6/10/2015    Procedure: PHACOEMULSIFICATION CLEAR CORNEA WITH TORIC INTRAOCULAR LENS IMPLANT;  Surgeon: Marquise Hung MD;  Location: [facility]       FAMILY HISTORY:  Family History   Problem Relation Age of Onset     Diabetes Mother        SOCIAL HISTORY:  Social History     Social History     Marital status:      Spouse name: N/A     Number of children: N/A     Years of education: N/A     Social History Main Topics     Smoking status: Never Smoker     Smokeless tobacco: Never Used      "Alcohol use Yes     Drug use: None     Sexual activity: Not Asked     Other Topics Concern     None     Social History Narrative       Review of Systems:  Skin:  Negative       Eyes:  Positive for glasses (readers only)    ENT:  Negative      Respiratory:  Negative       Cardiovascular:  Negative      Gastroenterology: Negative      Genitourinary:  Negative      Musculoskeletal:  Positive for joint pain    Neurologic:  Negative      Psychiatric:  Negative      Heme/Lymph/Imm:  Negative      Endocrine:  Negative        Physical Exam:  Vitals: /70  Pulse 60  Ht 1.626 m (5' 4\")  Wt 70.3 kg (154 lb 14.4 oz)  BMI 26.59 kg/m2    Constitutional:  cooperative;well developed        Skin:  warm and dry to the touch          Head:  normocephalic        Eyes:  pupils equal and round        Lymph:      ENT:  no pallor or cyanosis        Neck:  JVP normal        Respiratory:  clear to auscultation;normal symmetry         Cardiac: regular rhythm;normal S1 and S2                                                         GI:      benign    Extremities and Muscular Skeletal:  no edema         resolved r varicose veins    Neurological:  no gross motor deficits        Psych:  Alert and Oriented x 3    Encounter Diagnoses   Name Primary?     PVC's (premature ventricular contractions)      Venous (peripheral) insufficiency      Atypical chest pain      Benign essential hypertension Yes       Recent Lab Results:  LIPID RESULTS:  No results found for: CHOL, HDL, LDL, TRIG, CHOLHDLRATIO    LIVER ENZYME RESULTS:  No results found for: AST, ALT    CBC RESULTS:  No results found for: WBC, RBC, HGB, HCT, MCV, MCH, MCHC, RDW, PLT    BMP RESULTS:  Lab Results   Component Value Date    GFRESTIMATED 53 (L) 11/02/2018    GFRESTBLACK 64 11/02/2018        A1C RESULTS:  No results found for: A1C    INR RESULTS:  No results found for: INR      Thank you for allowing me to participate in the care of your patient.    Sincerely,     Bertha Love " SHEILA Erazo CNP     [facility]

## 2018-11-21 NOTE — PATIENT INSTRUCTIONS
Your CT coronary angiogram looks great.     Check your lab before you go home.    If your labs look good, then increase spironolactone to 25 and keep follow up with DR. Hassan next month.    [place] [phone]

## 2018-11-21 NOTE — PROGRESS NOTES
HPI and Plan:   I had the pleasure of seeing Mandy Jack today in cardiology clinic follow up. She is a pleasant 85 year old patient of Dr. Pereira.  She has a history of hypertension, hyperlipidemia, venous insufficiency status post venous seal and sclerotherapy to the right lower extremity and diverticulitis.  She also has PVCs treated with beta blockade.    She saw Dr. Pereira following her venous seal and was doing quite well.  She had a episode of chest discomfort and a CT coronary angiogram was done.  Her CT coronary angiogram showed no evidence of coronary artery calcification, she had widely patent coronary arteries without significant stenosis.  Her total calcium score was 0.  The radiology report was unremarkable.    During her venous heel procedure, she was hypertensive.  Her blood pressure is managed by her primary care doctor at [facility], Dr. Gerson Hassan.  Dr. Pereira recommended she increase her spironolactone which she did to 25 mg daily.  After her blood pressure fell down into the 120s and 130s, she stopped went back to the 12.5 mg tablet.  Today her blood pressure is elevated again at 160/70 by my nurses check and also by my own check.  She is on several antihypertensives including hydrochlorothiazide 12.5 mg, losartan 50, Toprol-XL 50 and spironolactone 12.5 mg.  Looking back through care everywhere her last BMP was in December 2017, at that time her  sodium was 136, potassium 4.3 and creatinine 1.05.  Overall today she feels great, no chest pain no shortness of breath PND or orthopnea.  She is very happy with the resolution of her varicosities.    Physical Exam  Please see Below     Assessment and Plan  1.  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  She has had symptom resolution.  She feels good when she wears her compression stockings and plans to continue to wear them.  2.  Hypertension.  Dr. Pereira recommended that she  increase her Aldactone to 25 mg daily, she did briefly but she is back down to 12.5 mg daily.  Perhaps she misunderstood the instructions.  Looking at the blood pressures that she brought in her blood pressure was better controlled on the higher dose.  She is on multiple medications that affect her electrolytes and kidney function, I have asked her to check a BMP today before she leaves clinic.  If that looks okay and her potassium is not too high than I would have her go back to the 25 mg dose of Aldactone.  She has an appointment in 1 month with her primary, Dr. Hassan.  This appointment is her annual checkup and she assures me they will check labs that day.  If not then we will bring her back to be seen in our clinic.    3.  Atypical chest discomfort.  Her CT coronary angiogram looks excellent with a calcium score of 0 and only trivial soft plaque noted.    Thank you for allowing me to care for Mandy Jack today.    SHEILA Roche, CNP  Cardiology    Voice recognition software was used for this note, I have reviewed this note, but errors may have been missed.    Orders Placed This Encounter   Procedures     Basic metabolic panel     No orders of the defined types were placed in this encounter.    Medications Discontinued During This Encounter   Medication Reason     desonide (DESOWEN) 0.05 % cream          CURRENT MEDICATIONS:  Current Outpatient Prescriptions   Medication Sig Dispense Refill     ASPIRIN PO Take 81 mg by mouth daily       Calcium Carbonate-Vit D-Min (CALTRATE PLUS OR) Take by mouth daily 600 mg- 400 units       hydrochlorothiazide (MICROZIDE) 12.5 MG capsule Take 12.5 mg by mouth every morning       losartan (COZAAR) 50 MG tablet Take 50 mg by mouth daily       magnesium 250 MG tablet Take 1 tablet by mouth daily       metoprolol (TOPROL-XL) 50 MG 24 hr tablet Take 50 mg by mouth       multivitamin, therapeutic (THERA-VIT) TABS Take 1 tablet by mouth daily       simvastatin (ZOCOR) 20  MG tablet Take by mouth At Bedtime       spironolactone (ALDACTONE) 25 MG tablet Take 12.5 mg by mouth         ALLERGIES     Allergies   Allergen Reactions     Chlorthalidone      Fentanyl      Low HR during cataract surgery     Meperidine      Nitrofurantoin      Septra [Sulfamethoxazole W/Trimethoprim]      Tolterodine        PAST MEDICAL HISTORY:  Past Medical History:   Diagnosis Date     Degeneration of cervical intervertebral disc      Hypertension 12/20/2016     Mixed hyperlipidemia      Other chronic nonalcoholic liver disease     steatosis of liver     Other premature beats     symptomatic PVCS     Recurrent cystitis      Rosacea     rosacea conjunctivitis     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       PAST SURGICAL HISTORY:  Past Surgical History:   Procedure Laterality Date     GENITOURINARY SURGERY      bladder suspension     GYN SURGERY      total abdominal hysterectomy     GYN SURGERY      oophorectomy     PHACOEMULSIFICATION CLEAR CORNEA WITH TORIC INTRAOCULAR LENS IMPLANT Left 5/20/2015    Procedure: PHACOEMULSIFICATION CLEAR CORNEA WITH TORIC INTRAOCULAR LENS IMPLANT;  Surgeon: Marquise Hung MD;  Location: [facility]     PHACOEMULSIFICATION CLEAR CORNEA WITH TORIC INTRAOCULAR LENS IMPLANT Right 6/10/2015    Procedure: PHACOEMULSIFICATION CLEAR CORNEA WITH TORIC INTRAOCULAR LENS IMPLANT;  Surgeon: Marquise Hung MD;  Location: [facility]       FAMILY HISTORY:  Family History   Problem Relation Age of Onset     Diabetes Mother        SOCIAL HISTORY:  Social History     Social History     Marital status:      Spouse name: N/A     Number of children: N/A     Years of education: N/A     Social History Main Topics     Smoking status: Never Smoker     Smokeless tobacco: Never Used     Alcohol use Yes     Drug use: None     Sexual activity: Not Asked     Other Topics Concern     None     Social History Narrative       Review of Systems:  Skin:  Negative       Eyes:  Positive for glasses (readers only)    ENT:   "Negative      Respiratory:  Negative       Cardiovascular:  Negative      Gastroenterology: Negative      Genitourinary:  Negative      Musculoskeletal:  Positive for joint pain    Neurologic:  Negative      Psychiatric:  Negative      Heme/Lymph/Imm:  Negative      Endocrine:  Negative        Physical Exam:  Vitals: /70  Pulse 60  Ht 1.626 m (5' 4\")  Wt 70.3 kg (154 lb 14.4 oz)  BMI 26.59 kg/m2    Constitutional:  cooperative;well developed        Skin:  warm and dry to the touch          Head:  normocephalic        Eyes:  pupils equal and round        Lymph:      ENT:  no pallor or cyanosis        Neck:  JVP normal        Respiratory:  clear to auscultation;normal symmetry         Cardiac: regular rhythm;normal S1 and S2                                                         GI:      benign    Extremities and Muscular Skeletal:  no edema         resolved r varicose veins    Neurological:  no gross motor deficits        Psych:  Alert and Oriented x 3    Encounter Diagnoses   Name Primary?     PVC's (premature ventricular contractions)      Venous (peripheral) insufficiency      Atypical chest pain      Benign essential hypertension Yes       Recent Lab Results:  LIPID RESULTS:  No results found for: CHOL, HDL, LDL, TRIG, CHOLHDLRATIO    LIVER ENZYME RESULTS:  No results found for: AST, ALT    CBC RESULTS:  No results found for: WBC, RBC, HGB, HCT, MCV, MCH, MCHC, RDW, PLT    BMP RESULTS:  Lab Results   Component Value Date    GFRESTIMATED 53 (L) 11/02/2018    GFRESTBLACK 64 11/02/2018        A1C RESULTS:  No results found for: A1C    INR RESULTS:  No results found for: INR        CC  Farshad Pereira MD  [address]  [place] [address]    "

## 2018-11-21 NOTE — MR AVS SNAPSHOT
After Visit Summary   11/21/2018    Mandy Jack    MRN: [id number]           Patient Information     Date Of Birth          10/2/1933        Visit Information        Provider Department      11/21/2018 11:00 AM Bertha Erazo APRN CNP [facility]        Today's Diagnoses     Benign essential hypertension    -  1    PVC's (premature ventricular contractions)        Venous (peripheral) insufficiency        Atypical chest pain          Care Instructions    Your CT coronary angiogram looks great.     Check your lab before you go home.    If your labs look good, then increase spironolactone to 25 and keep follow up with DR. Hassan next month.    Lizet  [phone]              Follow-ups after your visit        Future tests that were ordered for you today     Open Future Orders        Priority Expected Expires Ordered    Basic metabolic panel Routine 11/21/2018 11/21/2019 11/21/2018            Who to contact     If you have questions or need follow up information about today's clinic visit or your schedule please contact [facility] directly at [phone].  Normal or non-critical lab and imaging results will be communicated to you by MyChart, letter or phone within 4 business days after the clinic has received the results. If you do not hear from us within 7 days, please contact the clinic through MyChart or phone. If you have a critical or abnormal lab result, we will notify you by phone as soon as possible.  Submit refill requests through [employer] or call your pharmacy and they will forward the refill request to us. Please allow 3 business days for your refill to be completed.          Additional Information About Your Visit        Care EveryWhere ID     This is your Care EveryWhere ID. This could be used by other organizations to access your [facility] medical records  [id number]        Your Vitals Were      "Pulse Height BMI (Body Mass Index)             60 1.626 m (5' 4\") 26.59 kg/m2          Blood Pressure from Last 3 Encounters:   11/21/18 160/70   11/02/18 117/65   10/30/18 170/80    Weight from Last 3 Encounters:   11/21/18 70.3 kg (154 lb 14.4 oz)   10/30/18 70.4 kg (155 lb 3.2 oz)   07/05/18 70.3 kg (155 lb)              We Performed the Following     Follow-Up with Cardiac Advanced Practice Provider          Today's Medication Changes          These changes are accurate as of 11/21/18 11:29 AM.  If you have any questions, ask your nurse or doctor.               Stop taking these medicines if you haven't already. Please contact your care team if you have questions.     desonide 0.05 % cream   Commonly known as:  DESOWEN   Stopped by:  Berhta Erazo APRN CNP                    Primary Care Provider Office Phone # Fax #    Vincenzo MD Mo [phone]       [facility] [address] [address]        Equal Access to Services     [facility] AH: Hadii damon Holcomb, hola dolan, rica knox, heidi olguin . So [facility] [phone].    ATENCIÓN: Si habla español, tiene a choi disposición servicios gratuitos de asistencia lingüística. [facility] [phone].    We comply with applicable federal civil rights laws and [place] laws. We do not discriminate on the basis of race, color, national origin, age, disability, sex, sexual orientation, or gender identity.            Thank you!     Thank you for choosing [facility]  for your care. Our goal is always to provide you with excellent care. Hearing back from our patients is one way we can continue to improve our services. Please take a few minutes to complete the written survey that you may receive in the mail after your visit with us. Thank you!             Your Updated Medication List - Protect others around you: Learn how to safely use, " store and throw away your medicines at www.disposemymeds.org.          This list is accurate as of 11/21/18 11:29 AM.  Always use your most recent med list.                   Brand Name Dispense Instructions for use Diagnosis    ASPIRIN PO      Take 81 mg by mouth daily        CALTRATE PLUS OR      Take by mouth daily 600 mg- 400 units        hydrochlorothiazide 12.5 MG capsule    MICROZIDE     Take 12.5 mg by mouth every morning        losartan 50 MG tablet    COZAAR     Take 50 mg by mouth daily        magnesium 250 MG tablet      Take 1 tablet by mouth daily        metoprolol succinate 50 MG 24 hr tablet    TOPROL-XL     Take 50 mg by mouth        multivitamin, therapeutic Tabs tablet      Take 1 tablet by mouth daily        simvastatin 20 MG tablet    ZOCOR     Take by mouth At Bedtime        spironolactone 25 MG tablet    ALDACTONE     Take 12.5 mg by mouth

## 2018-11-21 NOTE — TELEPHONE ENCOUNTER
RN called and spoke with patient's  Yamil regarding the BMP lab and the recommendations listed below. Yamil acknowledged understanding regarding the medication change (losartan 50mg to 100mg daily) and that patient needs to have BMP rechecked in 1-2 weeks (order placed in [facility] and phone number for scheduling provided to patient's ). RN confirmed with patient's  that patient will be following up with her PMD at the end of December. Patient's  will have patient call to schedule lab apt and will have her call us back directly if she has any questions regarding the plan listed above.       saw Pat today, she was hypertensive so I wanted a BMP before I changed anything. She is hyponatremic, so I don't want to increase her spironolactone or her hydrochlorothiazide. She can increase losartan to 100 mg daily. We should repeat a BMP in 1-2 weeks. She should keep f/u with her PMD, we should send him a copy of her labs and of my note. He is with [facility] and she sees him at the end of December. He typically manages her BP. If she wants to f/u with me for BP, that's fine too, but she was planning to see him.   Thanks, Alexandra

## 2018-11-26 ENCOUNTER — TELEPHONE (OUTPATIENT)
Dept: CARDIOLOGY | Facility: CLINIC | Age: 83
End: 2018-11-26

## 2018-11-26 NOTE — TELEPHONE ENCOUNTER
Patient called to report that she would like to hold off on any BP med changes until she f/u with her PMD Dr. Hassan in December. Patient reports her BP's over the weekend have been 130's/70's. RN reviewed with patient rationale for BP med change, but patient politely declined and again advised she would like to speak with Dr. Hassan prior to any change. RN acknowledged understanding and advised patient that RN would message RUPERT Alexandra to ensure she is aware of patient waiting to increase BP med. Patient verbalized understanding and agreed with plan. Patient will call our office prior to her OV with PMD with any further questions/concerns.        11/21/18 telephone encounter  RN called and spoke with patient's  Yamil regarding the BMP lab and the recommendations listed below. Yamil acknowledged understanding regarding the medication change (losartan 50mg to 100mg daily) and that patient needs to have BMP rechecked in 1-2 weeks (order placed in [facility] and phone number for scheduling provided to patient's ). RN confirmed with patient's  that patient will be following up with her PMD at the end of December. Patient's  will have patient call to schedule lab apt and will have her call us back directly if she has any questions regarding the plan listed above.         saw Pat today, she was hypertensive so I wanted a BMP before I changed anything. She is hyponatremic, so I don't want to increase her spironolactone or her hydrochlorothiazide. She can increase losartan to 100 mg daily. We should repeat a BMP in 1-2 weeks. She should keep f/u with her PMD, we should send him a copy of her labs and of my note. He is with [facility] and she sees him at the end of December. He typically manages her BP. If she wants to f/u with me for BP, that's fine too, but she was planning to see him.   Thanks, Alexandra

## 2019-08-16 ENCOUNTER — HOSPITAL ENCOUNTER (EMERGENCY)
Facility: CLINIC | Age: 84
Discharge: HOME | End: 2019-08-16
Attending: EMERGENCY MEDICINE | Admitting: EMERGENCY MEDICINE
Payer: COMMERCIAL

## 2019-08-16 ENCOUNTER — APPOINTMENT (OUTPATIENT)
Dept: CT IMAGING | Facility: CLINIC | Age: 84
End: 2019-08-16
Attending: EMERGENCY MEDICINE
Payer: COMMERCIAL

## 2019-08-16 VITALS
HEIGHT: 62 IN | WEIGHT: 158 LBS | HEART RATE: 85 BPM | SYSTOLIC BLOOD PRESSURE: 145 MMHG | BODY MASS INDEX: 29.08 KG/M2 | RESPIRATION RATE: 18 BRPM | TEMPERATURE: 98.6 F | OXYGEN SATURATION: 94 % | DIASTOLIC BLOOD PRESSURE: 85 MMHG

## 2019-08-16 DIAGNOSIS — K29.80 DUODENITIS: ICD-10-CM

## 2019-08-16 LAB
ALBUMIN SERPL-MCNC: 4 G/DL (ref 3.4–5)
ALP SERPL-CCNC: 77 U/L (ref 40–150)
ALT SERPL W P-5'-P-CCNC: 21 U/L (ref 0–50)
ANION GAP SERPL CALCULATED.3IONS-SCNC: 11 MMOL/L (ref 3–14)
AST SERPL W P-5'-P-CCNC: 19 U/L (ref 0–45)
BASOPHILS # BLD AUTO: 0 10E9/L (ref 0–0.2)
BASOPHILS NFR BLD AUTO: 0.2 %
BILIRUB SERPL-MCNC: 0.8 MG/DL (ref 0.2–1.3)
BUN SERPL-MCNC: 12 MG/DL (ref 7–30)
CALCIUM SERPL-MCNC: 9.5 MG/DL (ref 8.5–10.1)
CHLORIDE SERPL-SCNC: 99 MMOL/L (ref 94–109)
CO2 SERPL-SCNC: 22 MMOL/L (ref 20–32)
CREAT SERPL-MCNC: 0.86 MG/DL (ref 0.52–1.04)
D DIMER PPP FEU-MCNC: 3.3 UG/ML FEU (ref 0–0.5)
DIFFERENTIAL METHOD BLD: ABNORMAL
EOSINOPHIL # BLD AUTO: 0.2 10E9/L (ref 0–0.7)
EOSINOPHIL NFR BLD AUTO: 1.4 %
ERYTHROCYTE [DISTWIDTH] IN BLOOD BY AUTOMATED COUNT: 13.5 % (ref 10–15)
GFR SERPL CREATININE-BSD FRML MDRD: 61 ML/MIN/{1.73_M2}
GLUCOSE SERPL-MCNC: 109 MG/DL (ref 70–99)
HCT VFR BLD AUTO: 42.3 % (ref 35–47)
HGB BLD-MCNC: 14.6 G/DL (ref 11.7–15.7)
IMM GRANULOCYTES # BLD: 0 10E9/L (ref 0–0.4)
IMM GRANULOCYTES NFR BLD: 0.1 %
INTERPRETATION ECG - MUSE: NORMAL
LIPASE SERPL-CCNC: 84 U/L (ref 73–393)
LYMPHOCYTES # BLD AUTO: 3.7 10E9/L (ref 0.8–5.3)
LYMPHOCYTES NFR BLD AUTO: 30.3 %
MCH RBC QN AUTO: 30.7 PG (ref 26.5–33)
MCHC RBC AUTO-ENTMCNC: 34.5 G/DL (ref 31.5–36.5)
MCV RBC AUTO: 89 FL (ref 78–100)
MONOCYTES # BLD AUTO: 1.4 10E9/L (ref 0–1.3)
MONOCYTES NFR BLD AUTO: 11.6 %
NEUTROPHILS # BLD AUTO: 6.9 10E9/L (ref 1.6–8.3)
NEUTROPHILS NFR BLD AUTO: 56.4 %
PLATELET # BLD AUTO: 346 10E9/L (ref 150–450)
POTASSIUM SERPL-SCNC: 3.7 MMOL/L (ref 3.4–5.3)
PROT SERPL-MCNC: 7.8 G/DL (ref 6.8–8.8)
RBC # BLD AUTO: 4.76 10E12/L (ref 3.8–5.2)
SODIUM SERPL-SCNC: 132 MMOL/L (ref 133–144)
TROPONIN I SERPL-MCNC: <0.015 UG/L (ref 0–0.04)
WBC # BLD AUTO: 12.2 10E9/L (ref 4–11)

## 2019-08-16 PROCEDURE — 71260 CT THORAX DX C+: CPT

## 2019-08-16 PROCEDURE — 85025 COMPLETE CBC W/AUTO DIFF WBC: CPT | Performed by: EMERGENCY MEDICINE

## 2019-08-16 PROCEDURE — 25000128 H RX IP 250 OP 636: Performed by: EMERGENCY MEDICINE

## 2019-08-16 PROCEDURE — 83690 ASSAY OF LIPASE: CPT | Performed by: EMERGENCY MEDICINE

## 2019-08-16 PROCEDURE — 80053 COMPREHEN METABOLIC PANEL: CPT | Performed by: EMERGENCY MEDICINE

## 2019-08-16 PROCEDURE — 85379 FIBRIN DEGRADATION QUANT: CPT | Performed by: EMERGENCY MEDICINE

## 2019-08-16 PROCEDURE — 99285 EMERGENCY DEPT VISIT HI MDM: CPT | Mod: 25

## 2019-08-16 PROCEDURE — 25000125 ZZHC RX 250: Performed by: EMERGENCY MEDICINE

## 2019-08-16 PROCEDURE — 25000132 ZZH RX MED GY IP 250 OP 250 PS 637: Performed by: EMERGENCY MEDICINE

## 2019-08-16 PROCEDURE — 74177 CT ABD & PELVIS W/CONTRAST: CPT

## 2019-08-16 PROCEDURE — 84484 ASSAY OF TROPONIN QUANT: CPT | Performed by: EMERGENCY MEDICINE

## 2019-08-16 PROCEDURE — 93005 ELECTROCARDIOGRAM TRACING: CPT

## 2019-08-16 RX ORDER — SUCRALFATE 1 G/1
1 TABLET ORAL 4 TIMES DAILY
Qty: 56 TABLET | Refills: 0 | Status: SHIPPED | OUTPATIENT
Start: 2019-08-16 | End: 2019-08-30

## 2019-08-16 RX ORDER — IOPAMIDOL 755 MG/ML
80 INJECTION, SOLUTION INTRAVASCULAR ONCE
Status: COMPLETED | OUTPATIENT
Start: 2019-08-16 | End: 2019-08-16

## 2019-08-16 RX ORDER — PANTOPRAZOLE SODIUM 40 MG/1
40 TABLET, DELAYED RELEASE ORAL 2 TIMES DAILY
Qty: 60 TABLET | Refills: 0 | Status: SHIPPED | OUTPATIENT
Start: 2019-08-16 | End: 2019-09-15

## 2019-08-16 RX ADMIN — IOPAMIDOL 80 ML: 755 INJECTION, SOLUTION INTRAVENOUS at 09:06

## 2019-08-16 RX ADMIN — LIDOCAINE HYDROCHLORIDE 30 ML: 20 SOLUTION ORAL; TOPICAL at 07:47

## 2019-08-16 RX ADMIN — SODIUM CHLORIDE 88 ML: 9 INJECTION, SOLUTION INTRAVENOUS at 09:14

## 2019-08-16 ASSESSMENT — MIFFLIN-ST. JEOR: SCORE: 1114.93

## 2019-08-16 ASSESSMENT — ENCOUNTER SYMPTOMS
FEVER: 0
SHORTNESS OF BREATH: 1
NAUSEA: 1
BACK PAIN: 1
ABDOMINAL PAIN: 1

## 2019-08-16 NOTE — DISCHARGE INSTRUCTIONS
Referral for Dr. Mccoy to be seen on Monday August 19th 2019 for abdominal pain needing an EGD.    You have an appointment with Dr. Mccoy on Monday August 19th, 2019 at 11:40 am (arrive by 11:20 am) at the [facility].  If you have questions re: this appointment please call the clinic at [phone].  Please bring your discharge papers with you to your appointment.    [facility]  [facility] [address] [place], [place] [id number] [phone]

## 2019-08-16 NOTE — ED TRIAGE NOTES
X 2 weeks intermittently, pt states she had to sleep 'propped up' last night which help. Intermittent symptoms x 2 weeks

## 2019-08-16 NOTE — ED PROVIDER NOTES
"  History     Chief Complaint:  Epigastric pain     HPI   Mandy Jack is a 85 year old female with a history of hypertension and hyperlipidemia who presents with epigastric pain. The patient has been having intermittent epigastric discomfort primarily after eating heavier foods for the last few weeks. She says there is \"gurgling and growling\" with increased associated discomfort and that this also extends to the back. She has also been having mild shortness of breath and pain with deep inhalation. She also endorses some slight nausea that is constant. She denies fever. Two weeks ago, the patient had pain originating in the left foot radiating up to the posterior head that went away after taking 4 325 mg aspirin. She had a normal stress test last year.     CARDIAC RISK FACTORS:  Sex:    Female  Tobacco:   No  Hypertension:   Yes  Hyperlipidemia:  Yes  Diabetes:   No  Family History:  No    PE/DVT RISK FACTORS:  Sex:    Female  Hormones:   No  Tobacco:   No  Cancer:   No  Travel:   No  Surgery:   No  Other immobilization: No  Personal history:  No  Family history:  No     Allergies:  Chlorthalidone  Fentanyl  Meperidine  Nitrofurantoin  Septra  Tetracaine  Tolterodine     Medications:    Simvastatin  Hydrochlorothiazide   Losartan  Metoprolol  Spironolactone     Past Medical History:    PVCs  Degeneration of cervical intervertebral disc  Hypertension  Hyperlipidemia  Chronic nonalcoholic liver disease  White coat syndrome      Past Surgical History:    JENNI  Oophorectomy  Bladder suspension  Varicose vein surgery   Bilateral phacoemulsification clear cornea with toric IOL    Family History:    Diabetes  Breast cancer  Hypertension    Social History:  Smoking status: Never  Alcohol use: Yes  Drug use: None  Patient presents alone.  PCP: Vincenzo Hassan    Marital Status:       Review of Systems   Constitutional: Negative for fever.   Respiratory: Positive for shortness of breath.    Gastrointestinal: Positive " "for abdominal pain and nausea.   Musculoskeletal: Positive for back pain.   All other systems reviewed and are negative.        Physical Exam     Patient Vitals for the past 24 hrs:   BP Temp Temp src Pulse Resp SpO2 Height Weight   08/16/19 0721 (!) 168/86 98.6  F (37  C) Oral 88 18 94 % 1.575 m (5' 2\") 71.7 kg (158 lb)      Physical Exam  General: Alert, interactive in mild distress  Head:  Scalp is atraumatic  Eyes:  The pupils are equal, round, and reactive to light    EOM's intact    No scleral icterus  ENT:      Nose:  The external nose is normal  Ears:  External ears are normal  Mouth/Throat: The oropharynx is normal    Mucus membranes are moist       Neck:  Normal range of motion.      There is no rigidity.    Trachea is in the midline         CV:  Regular rate and rhythm    No murmur   Resp:  Breath sounds are clear bilaterally    Non-labored, no retractions or accessory muscle use      GI:  Abdomen is soft, no distension. Epigastric tenderness.      MS:  Normal strength in all 4 extremities, No midline cervical, thoracic, or lumbar tenderness  Skin:  Warm and dry, No rash or lesions noted.  Neuro: Strength 5/5 x4.  Sensation intact  In all 4 extremities.      GCS: 15  Psych:  Awake. Alert.  Normal affect.      Appropriate interactions.    Emergency Department Course     ECG (7:57:19):  Rate 77 bpm. SD interval 168. QRS duration 88. QT/QTc 374/423. P-R-T axes 28 0 4. Normal sinus rhythm. Interpreted at 0805 by TriggerPorter MD.     Imaging:  Radiographic findings were communicated with the patient who voiced understanding of the findings.    CT-scan Chest/Abdomen/Pelvis PE w/ contrast:  1. No evidence of pulmonary embolism. Thoracic and abdominal aorta are  unremarkable with scattered calcified plaque. No evidence of aneurysm  or dissection.  2. Marked inflammation and wall thickening proximal duodenum  concerning for duodenal ulcer disease. No discrete mass is  appreciated. No extraluminal fluid " or air to suggest a perforated  ulcer. Follow-up endoscopy as needed for further assessment.  As read by Radiology.      Laboratory:  CBC: WBC 12.2 (H), o/w WNL (HGB 14.6, )  CMP: Sodium 132 (L), Glucose 109 (H), o/w WNL (Creatinine 0.86)   0739 Troponin: <0.015  D-dimer: 3.3 (H)  Lipase: 84    Interventions:  0747 - GI Cocktail 30 mL PO    Emergency Department Course:  Past medical records, nursing notes, and vitals reviewed.  0739: I performed an exam of the patient and obtained history, as documented above.      IV inserted and blood drawn. This was sent to laboratory for testing, findings above.  The patient was sent for a chest/abdomen/pelvis CT while in the emergency department, findings above.    0938: I rechecked the patient. Explained findings to patient.     1005: I rechecked the patient. Findings and plan explained to the Patient. Patient discharged home with instructions regarding supportive care, medications, and reasons to return. The importance of close follow-up was reviewed.      Impression & Plan      Medical Decision Making:  Mandy Jack is a 85 year old female who was seen and evaluated. The above work up was taken demonstrating findings consistent with duodenitis. There are no signs of acute GI bleed, hemodynamic instability, pancreatitis, cholecystitis, bowel obstruction, or more concerning illness. I have prescribed Carafate and Protonix and established follow up with Dr. Mccoy from Gastroenterology on Monday, August 19 for a likely endoscopy. In the meantime, the patient was instructed that if she notes blood in her stool, increasing pain, then she has to return here. There is no need for empiric antibiotics. I think this is the likely of her illness. I doubt acute coronary syndrome, pulmonary embolism, aortic dissection, or more concerning illness. Patient and  were informed of the findings who are in agreement with this plan and were subsequently discharged to home.           Diagnosis:    ICD-10-CM    1. Duodenitis K29.80      Disposition:  Discharged.     Discharge Medications:  New Prescriptions    PANTOPRAZOLE (PROTONIX) 40 MG EC TABLET    Take 1 tablet (40 mg) by mouth 2 times daily    SUCRALFATE (CARAFATE) 1 GM TABLET    Take 1 tablet (1 g) by mouth 4 times daily for 14 days       Jabari Grimaldo  8/16/2019    EMERGENCY DEPARTMENT    Scribe Disclosure:  I, Jabari Grimaldo, am serving as a scribe at 7:53 AM on 8/16/2019 to document services personally performed by Porter Brush MD based on my observations and the provider's statements to me.        Porter Brush MD  08/16/19 [id number]

## 2019-08-16 NOTE — ED AVS SNAPSHOT
Emergency Department  [address] [place] [id number]  Phone:  [phone]  Fax:  [phone]                                    Mandy Jack   MRN: [id number]    Department:   Emergency Department   Date of Visit:  8/16/2019           After Visit Summary Signature Page    I have received my discharge instructions, and my questions have been answered. I have discussed any challenges I see with this plan with the nurse or doctor.    ..........................................................................................................................................  Patient/Patient Representative Signature      ..........................................................................................................................................  Patient Representative Print Name and Relationship to Patient    ..................................................               ................................................  Date                                   Time    ..........................................................................................................................................  Reviewed by Signature/Title    ...................................................              ..............................................  Date                                               Time          22EPIC Rev 08/18

## 2021-02-08 ENCOUNTER — HOSPITAL ENCOUNTER (OUTPATIENT)
Dept: MAMMOGRAPHY | Facility: CLINIC | Age: 86
Discharge: HOME | End: 2021-02-08
Attending: FAMILY MEDICINE | Admitting: FAMILY MEDICINE
Payer: COMMERCIAL

## 2021-02-08 DIAGNOSIS — Z12.31 VISIT FOR SCREENING MAMMOGRAM: ICD-10-CM

## 2021-02-08 PROCEDURE — 77063 BREAST TOMOSYNTHESIS BI: CPT

## 2021-02-17 ENCOUNTER — IMMUNIZATION (OUTPATIENT)
Dept: NURSING | Facility: CLINIC | Age: 86
End: 2021-02-17
Payer: COMMERCIAL

## 2021-02-17 PROCEDURE — 0001A PR COVID VAC PFIZER DIL RECON 30 MCG/0.3 ML IM: CPT

## 2021-02-17 PROCEDURE — 91300 PR COVID VAC PFIZER DIL RECON 30 MCG/0.3 ML IM: CPT

## 2021-03-10 ENCOUNTER — IMMUNIZATION (OUTPATIENT)
Dept: NURSING | Facility: CLINIC | Age: 86
End: 2021-03-10
Attending: INTERNAL MEDICINE
Payer: COMMERCIAL

## 2021-03-10 PROCEDURE — 91300 PR COVID VAC PFIZER DIL RECON 30 MCG/0.3 ML IM: CPT

## 2021-03-10 PROCEDURE — 0002A PR COVID VAC PFIZER DIL RECON 30 MCG/0.3 ML IM: CPT

## 2021-03-20 ENCOUNTER — HEALTH MAINTENANCE LETTER (OUTPATIENT)
Age: 86
End: 2021-03-20

## 2021-06-08 ENCOUNTER — APPOINTMENT (OUTPATIENT)
Dept: URBAN - METROPOLITAN AREA CLINIC 256 | Age: 86
Setting detail: DERMATOLOGY
End: 2021-06-08

## 2021-06-08 VITALS — HEIGHT: 62 IN | WEIGHT: 140 LBS | RESPIRATION RATE: 16 BRPM

## 2021-06-08 DIAGNOSIS — L98.8 OTHER SPECIFIED DISORDERS OF THE SKIN AND SUBCUTANEOUS TISSUE: ICD-10-CM

## 2021-06-08 DIAGNOSIS — L82.1 OTHER SEBORRHEIC KERATOSIS: ICD-10-CM

## 2021-06-08 DIAGNOSIS — L81.4 OTHER MELANIN HYPERPIGMENTATION: ICD-10-CM

## 2021-06-08 DIAGNOSIS — L30.9 DERMATITIS, UNSPECIFIED: ICD-10-CM

## 2021-06-08 DIAGNOSIS — L57.8 OTHER SKIN CHANGES DUE TO CHRONIC EXPOSURE TO NONIONIZING RADIATION: ICD-10-CM

## 2021-06-08 DIAGNOSIS — D18.0 HEMANGIOMA: ICD-10-CM

## 2021-06-08 DIAGNOSIS — L82.0 INFLAMED SEBORRHEIC KERATOSIS: ICD-10-CM

## 2021-06-08 DIAGNOSIS — D22 MELANOCYTIC NEVI: ICD-10-CM

## 2021-06-08 DIAGNOSIS — L70.0 ACNE VULGARIS: ICD-10-CM

## 2021-06-08 PROBLEM — D18.01 HEMANGIOMA OF SKIN AND SUBCUTANEOUS TISSUE: Status: ACTIVE | Noted: 2021-06-08

## 2021-06-08 PROBLEM — D22.5 MELANOCYTIC NEVI OF TRUNK: Status: ACTIVE | Noted: 2021-06-08

## 2021-06-08 PROCEDURE — OTHER REASSURANCE: OTHER

## 2021-06-08 PROCEDURE — OTHER ACNE SURGERY: OTHER

## 2021-06-08 PROCEDURE — OTHER LIQUID NITROGEN: OTHER

## 2021-06-08 PROCEDURE — 99203 OFFICE O/P NEW LOW 30 MIN: CPT | Mod: 25

## 2021-06-08 PROCEDURE — 17110 DESTRUCT B9 LESION 1-14: CPT

## 2021-06-08 PROCEDURE — OTHER BENIGN DESTRUCTION: OTHER

## 2021-06-08 PROCEDURE — OTHER COUNSELING: OTHER

## 2021-06-08 PROCEDURE — 10040 ACNE SURGERY: CPT

## 2021-06-08 PROCEDURE — OTHER PRESCRIPTION: OTHER

## 2021-06-08 RX ORDER — TACROLIMUS 1 MG/G
0.1% OINTMENT TOPICAL BID
Qty: 1 | Refills: 3 | Status: ERX | COMMUNITY
Start: 2021-06-08

## 2021-06-08 ASSESSMENT — LOCATION ZONE DERM
LOCATION ZONE: FACE
LOCATION ZONE: LIP
LOCATION ZONE: TRUNK
LOCATION ZONE: ARM

## 2021-06-08 ASSESSMENT — LOCATION DETAILED DESCRIPTION DERM
LOCATION DETAILED: LEFT RIB CAGE
LOCATION DETAILED: RIGHT MEDIAL SUPERIOR CHEST
LOCATION DETAILED: RIGHT MEDIAL UPPER BACK
LOCATION DETAILED: LEFT INFERIOR CENTRAL MALAR CHEEK
LOCATION DETAILED: RIGHT SUPERIOR MEDIAL UPPER BACK
LOCATION DETAILED: INFERIOR THORACIC SPINE
LOCATION DETAILED: RIGHT LOWER CUTANEOUS LIP
LOCATION DETAILED: RIGHT INFERIOR CENTRAL MALAR CHEEK
LOCATION DETAILED: RIGHT INFERIOR MEDIAL FOREHEAD
LOCATION DETAILED: LEFT POSTERIOR SHOULDER
LOCATION DETAILED: RIGHT SUPERIOR LATERAL MIDBACK
LOCATION DETAILED: RIGHT SUPERIOR UPPER BACK

## 2021-06-08 ASSESSMENT — LOCATION SIMPLE DESCRIPTION DERM
LOCATION SIMPLE: LEFT CHEEK
LOCATION SIMPLE: RIGHT LIP
LOCATION SIMPLE: RIGHT CHEEK
LOCATION SIMPLE: CHEST
LOCATION SIMPLE: RIGHT FOREHEAD
LOCATION SIMPLE: LEFT SHOULDER
LOCATION SIMPLE: UPPER BACK
LOCATION SIMPLE: RIGHT LOWER BACK
LOCATION SIMPLE: RIGHT UPPER BACK
LOCATION SIMPLE: ABDOMEN

## 2021-06-08 NOTE — PROCEDURE: LIQUID NITROGEN
Render Post-Care Instructions In Note?: yes
Add 52 Modifier (Optional): no
Consent: The patient's consent was obtained including but not limited to risks of crusting, scabbing, blistering, scarring, darker or lighter pigmentary change, recurrence, incomplete removal and infection.
Number Of Freeze-Thaw Cycles: 3 freeze-thaw cycles
Duration Of Freeze Thaw-Cycle (Seconds): 2
Post-Care Instructions: I reviewed with the patient in detail post-care instructions. Patient is to wear sunprotection, and avoid picking at any of the treated lesions. Pt may apply Vaseline to crusted or scabbing areas.
Medical Necessity Information: It is in your best interest to select a reason for this procedure from the list below. All of these items fulfill various CMS LCD requirements except the new and changing color options.
Detail Level: Simple
Medical Necessity Clause: This procedure was medically necessary because the lesions that were treated were: traumatized when grooming

## 2021-06-08 NOTE — PROCEDURE: ACNE SURGERY
Extraction Method: comedo extractor
Hemostasis: Pressure
Render Number Of Lesions Treated: yes
Prep Text (Optional): Prior to removal the treatment areas were prepped in the usual fashion.
Acne Type: Comedonal Lesions
Detail Level: Detailed
Consent was obtained and risks were reviewed including but not limited to scarring, infection, bleeding, scabbing, incomplete removal, and allergy to anesthesia.
Post-Care Instructions: I reviewed with the patient in detail post-care instructions. Patient is to keep the treatment areas dry overnight, and then apply bacitracin twice daily until healed. Patient may apply hydrogen peroxide soaks to remove any crusting.

## 2021-06-08 NOTE — PROCEDURE: BENIGN DESTRUCTION
Detail Level: Detailed
Include Z78.9 (Other Specified Conditions Influencing Health Status) As An Associated Diagnosis?: No
Treatment Number (Will Not Render If 0): 0
Medical Necessity Information: It is in your best interest to select a reason for this procedure from the list below. All of these items fulfill various CMS LCD requirements except the new and changing color options.
Consent: The patient's consent was obtained including but not limited to risks of crusting, scabbing, blistering, scarring, darker or lighter pigmentary change, recurrence, incomplete removal and infection.
Render Post-Care Instructions In Note?: yes
Medical Necessity Clause: This procedure was medically necessary because the lesions that were treated were:
Anesthesia Volume In Cc: 1
Post-Care Instructions: I reviewed with the patient in detail post-care instructions. Patient is to wear sunprotection, and avoid picking at any of the treated lesions. Pt may apply Vaseline to crusted or scabbing areas.

## 2021-06-10 RX ORDER — TACROLIMUS 1 MG/G
OINTMENT TOPICAL BID
Qty: 1 | Refills: 3 | Status: ERX

## 2021-10-24 ENCOUNTER — HEALTH MAINTENANCE LETTER (OUTPATIENT)
Age: 86
End: 2021-10-24

## 2022-01-01 ENCOUNTER — APPOINTMENT (OUTPATIENT)
Dept: GENERAL RADIOLOGY | Facility: CLINIC | Age: 87
DRG: 435 | End: 2022-01-01
Attending: INTERNAL MEDICINE
Payer: COMMERCIAL

## 2022-01-01 ENCOUNTER — HEALTH MAINTENANCE LETTER (OUTPATIENT)
Age: 87
End: 2022-01-01

## 2022-01-01 ENCOUNTER — ANESTHESIA (OUTPATIENT)
Dept: SURGERY | Facility: CLINIC | Age: 87
DRG: 435 | End: 2022-01-01
Payer: COMMERCIAL

## 2022-01-01 ENCOUNTER — HOSPITAL ENCOUNTER (OUTPATIENT)
Dept: MAMMOGRAPHY | Facility: CLINIC | Age: 87
Discharge: HOME | End: 2022-02-16
Attending: FAMILY MEDICINE | Admitting: FAMILY MEDICINE
Payer: COMMERCIAL

## 2022-01-01 ENCOUNTER — ANESTHESIA EVENT (OUTPATIENT)
Dept: SURGERY | Facility: CLINIC | Age: 87
DRG: 435 | End: 2022-01-01
Payer: COMMERCIAL

## 2022-01-01 ENCOUNTER — APPOINTMENT (OUTPATIENT)
Dept: CT IMAGING | Facility: CLINIC | Age: 87
DRG: 435 | End: 2022-01-01
Attending: PHYSICIAN ASSISTANT
Payer: COMMERCIAL

## 2022-01-01 ENCOUNTER — HOSPITAL ENCOUNTER (INPATIENT)
Facility: CLINIC | Age: 87
LOS: 3 days | Discharge: HOSPICE | DRG: 435 | End: 2022-09-22
Attending: PHYSICIAN ASSISTANT | Admitting: HOSPITALIST
Payer: COMMERCIAL

## 2022-01-01 VITALS
WEIGHT: 134 LBS | RESPIRATION RATE: 20 BRPM | OXYGEN SATURATION: 95 % | SYSTOLIC BLOOD PRESSURE: 140 MMHG | HEIGHT: 62 IN | TEMPERATURE: 98.1 F | BODY MASS INDEX: 24.66 KG/M2 | DIASTOLIC BLOOD PRESSURE: 67 MMHG | HEART RATE: 64 BPM

## 2022-01-01 DIAGNOSIS — K86.89 PANCREATIC MASS: ICD-10-CM

## 2022-01-01 DIAGNOSIS — E87.1 HYPONATREMIA: ICD-10-CM

## 2022-01-01 DIAGNOSIS — Z12.31 ENCOUNTER FOR SCREENING MAMMOGRAM FOR BREAST CANCER: ICD-10-CM

## 2022-01-01 LAB
ALBUMIN SERPL-MCNC: 2.6 G/DL (ref 3.4–5)
ALBUMIN SERPL-MCNC: 3.5 G/DL (ref 3.4–5)
ALBUMIN UR-MCNC: NEGATIVE MG/DL
ALP SERPL-CCNC: 332 U/L (ref 40–150)
ALP SERPL-CCNC: 500 U/L (ref 40–150)
ALT SERPL W P-5'-P-CCNC: 650 U/L (ref 0–50)
ALT SERPL W P-5'-P-CCNC: 914 U/L (ref 0–50)
ANION GAP SERPL CALCULATED.3IONS-SCNC: 12 MMOL/L (ref 3–14)
ANION GAP SERPL CALCULATED.3IONS-SCNC: 9 MMOL/L (ref 3–14)
APPEARANCE UR: CLEAR
AST SERPL W P-5'-P-CCNC: 250 U/L (ref 0–45)
AST SERPL W P-5'-P-CCNC: 403 U/L (ref 0–45)
BACTERIA #/AREA URNS HPF: ABNORMAL /HPF
BACTERIA BLD CULT: NO GROWTH
BACTERIA BLD CULT: NO GROWTH
BASOPHILS # BLD AUTO: 0 10E3/UL (ref 0–0.2)
BASOPHILS NFR BLD AUTO: 0 %
BILIRUB DIRECT SERPL-MCNC: 4.4 MG/DL (ref 0–0.2)
BILIRUB SERPL-MCNC: 2.2 MG/DL (ref 0.2–1.3)
BILIRUB SERPL-MCNC: 5.6 MG/DL (ref 0.2–1.3)
BILIRUB UR QL STRIP: NEGATIVE
BUN SERPL-MCNC: 12 MG/DL (ref 7–30)
BUN SERPL-MCNC: 8 MG/DL (ref 7–30)
CALCIUM SERPL-MCNC: 8.5 MG/DL (ref 8.5–10.1)
CALCIUM SERPL-MCNC: 9.8 MG/DL (ref 8.5–10.1)
CANCER AG19-9 SERPL IA-ACNC: 392 U/ML
CHLORIDE BLD-SCNC: 87 MMOL/L (ref 94–109)
CHLORIDE BLD-SCNC: 99 MMOL/L (ref 94–109)
CO2 SERPL-SCNC: 19 MMOL/L (ref 20–32)
CO2 SERPL-SCNC: 21 MMOL/L (ref 20–32)
COLOR UR AUTO: YELLOW
CREAT SERPL-MCNC: 0.62 MG/DL (ref 0.52–1.04)
CREAT SERPL-MCNC: 0.63 MG/DL (ref 0.52–1.04)
EOSINOPHIL # BLD AUTO: 0 10E3/UL (ref 0–0.7)
EOSINOPHIL NFR BLD AUTO: 0 %
ERCP: NORMAL
ERYTHROCYTE [DISTWIDTH] IN BLOOD BY AUTOMATED COUNT: 12.9 % (ref 10–15)
ERYTHROCYTE [DISTWIDTH] IN BLOOD BY AUTOMATED COUNT: 14 % (ref 10–15)
GFR SERPL CREATININE-BSD FRML MDRD: 85 ML/MIN/1.73M2
GFR SERPL CREATININE-BSD FRML MDRD: 85 ML/MIN/1.73M2
GLUCOSE BLD-MCNC: 123 MG/DL (ref 70–99)
GLUCOSE BLD-MCNC: 87 MG/DL (ref 70–99)
GLUCOSE UR STRIP-MCNC: NEGATIVE MG/DL
HCT VFR BLD AUTO: 28.6 % (ref 35–47)
HCT VFR BLD AUTO: 31.8 % (ref 35–47)
HGB BLD-MCNC: 11.4 G/DL (ref 11.7–15.7)
HGB BLD-MCNC: 9.8 G/DL (ref 11.7–15.7)
HGB UR QL STRIP: NEGATIVE
IMM GRANULOCYTES # BLD: 0 10E3/UL
IMM GRANULOCYTES NFR BLD: 0 %
INR PPP: 1.13 (ref 0.85–1.15)
KETONES UR STRIP-MCNC: NEGATIVE MG/DL
LEUKOCYTE ESTERASE UR QL STRIP: NEGATIVE
LIPASE SERPL-CCNC: 690 U/L (ref 73–393)
LYMPHOCYTES # BLD AUTO: 1.6 10E3/UL (ref 0.8–5.3)
LYMPHOCYTES NFR BLD AUTO: 22 %
MCH RBC QN AUTO: 31.2 PG (ref 26.5–33)
MCH RBC QN AUTO: 31.4 PG (ref 26.5–33)
MCHC RBC AUTO-ENTMCNC: 34.3 G/DL (ref 31.5–36.5)
MCHC RBC AUTO-ENTMCNC: 35.8 G/DL (ref 31.5–36.5)
MCV RBC AUTO: 88 FL (ref 78–100)
MCV RBC AUTO: 91 FL (ref 78–100)
MONOCYTES # BLD AUTO: 0.9 10E3/UL (ref 0–1.3)
MONOCYTES NFR BLD AUTO: 13 %
MUCOUS THREADS #/AREA URNS LPF: PRESENT /LPF
NEUTROPHILS # BLD AUTO: 4.6 10E3/UL (ref 1.6–8.3)
NEUTROPHILS NFR BLD AUTO: 65 %
NITRATE UR QL: NEGATIVE
NRBC # BLD AUTO: 0 10E3/UL
NRBC BLD AUTO-RTO: 0 /100
OSMOLALITY SERPL: 257 MMOL/KG (ref 280–301)
OSMOLALITY UR: 285 MMOL/KG (ref 100–1200)
PATH REPORT.ADDENDUM SPEC: ABNORMAL
PATH REPORT.COMMENTS IMP SPEC: ABNORMAL
PATH REPORT.COMMENTS IMP SPEC: YES
PATH REPORT.FINAL DX SPEC: ABNORMAL
PATH REPORT.GROSS SPEC: ABNORMAL
PATH REPORT.MICROSCOPIC SPEC OTHER STN: ABNORMAL
PATH REPORT.RELEVANT HX SPEC: ABNORMAL
PH UR STRIP: 6.5 [PH] (ref 5–7)
PHOTO IMAGE: ABNORMAL
PLATELET # BLD AUTO: 248 10E3/UL (ref 150–450)
PLATELET # BLD AUTO: 286 10E3/UL (ref 150–450)
POTASSIUM BLD-SCNC: 3.6 MMOL/L (ref 3.4–5.3)
POTASSIUM BLD-SCNC: 3.8 MMOL/L (ref 3.4–5.3)
PROT SERPL-MCNC: 5.3 G/DL (ref 6.8–8.8)
PROT SERPL-MCNC: 6.9 G/DL (ref 6.8–8.8)
RBC # BLD AUTO: 3.14 10E6/UL (ref 3.8–5.2)
RBC # BLD AUTO: 3.63 10E6/UL (ref 3.8–5.2)
RBC URINE: 1 /HPF
SODIUM SERPL-SCNC: 120 MMOL/L (ref 133–144)
SODIUM SERPL-SCNC: 123 MMOL/L (ref 133–144)
SODIUM SERPL-SCNC: 123 MMOL/L (ref 133–144)
SODIUM SERPL-SCNC: 125 MMOL/L (ref 133–144)
SODIUM SERPL-SCNC: 127 MMOL/L (ref 133–144)
SODIUM UR-SCNC: 58 MMOL/L
SP GR UR STRIP: 1.02 (ref 1–1.03)
SQUAMOUS EPITHELIAL: 1 /HPF
UPPER EUS: NORMAL
UROBILINOGEN UR STRIP-MCNC: NORMAL MG/DL
WBC # BLD AUTO: 7.1 10E3/UL (ref 4–11)
WBC # BLD AUTO: 7.8 10E3/UL (ref 4–11)
WBC URINE: 1 /HPF

## 2022-01-01 PROCEDURE — 36415 COLL VENOUS BLD VENIPUNCTURE: CPT | Performed by: INTERNAL MEDICINE

## 2022-01-01 PROCEDURE — 84295 ASSAY OF SERUM SODIUM: CPT | Performed by: HOSPITALIST

## 2022-01-01 PROCEDURE — 999N000141 HC STATISTIC PRE-PROCEDURE NURSING ASSESSMENT: Performed by: INTERNAL MEDICINE

## 2022-01-01 PROCEDURE — 0FBG3ZX EXCISION OF PANCREAS, PERCUTANEOUS APPROACH, DIAGNOSTIC: ICD-10-PCS | Performed by: INTERNAL MEDICINE

## 2022-01-01 PROCEDURE — 99223 1ST HOSP IP/OBS HIGH 75: CPT | Performed by: NURSE PRACTITIONER

## 2022-01-01 PROCEDURE — 86301 IMMUNOASSAY TUMOR CA 19-9: CPT | Performed by: INTERNAL MEDICINE

## 2022-01-01 PROCEDURE — 360N000083 HC SURGERY LEVEL 3 W/ FLUORO, PER MIN: Performed by: INTERNAL MEDICINE

## 2022-01-01 PROCEDURE — 99233 SBSQ HOSP IP/OBS HIGH 50: CPT | Performed by: INTERNAL MEDICINE

## 2022-01-01 PROCEDURE — 84300 ASSAY OF URINE SODIUM: CPT | Performed by: HOSPITALIST

## 2022-01-01 PROCEDURE — 85025 COMPLETE CBC W/AUTO DIFF WBC: CPT | Performed by: EMERGENCY MEDICINE

## 2022-01-01 PROCEDURE — 0F798DZ DILATION OF COMMON BILE DUCT WITH INTRALUMINAL DEVICE, VIA NATURAL OR ARTIFICIAL OPENING ENDOSCOPIC: ICD-10-PCS | Performed by: INTERNAL MEDICINE

## 2022-01-01 PROCEDURE — 99239 HOSP IP/OBS DSCHRG MGMT >30: CPT | Performed by: INTERNAL MEDICINE

## 2022-01-01 PROCEDURE — C9803 HOPD COVID-19 SPEC COLLECT: HCPCS

## 2022-01-01 PROCEDURE — 370N000017 HC ANESTHESIA TECHNICAL FEE, PER MIN: Performed by: INTERNAL MEDICINE

## 2022-01-01 PROCEDURE — 99223 1ST HOSP IP/OBS HIGH 75: CPT | Mod: AI | Performed by: HOSPITALIST

## 2022-01-01 PROCEDURE — 80053 COMPREHEN METABOLIC PANEL: CPT | Performed by: EMERGENCY MEDICINE

## 2022-01-01 PROCEDURE — 88307 TISSUE EXAM BY PATHOLOGIST: CPT | Mod: TC | Performed by: INTERNAL MEDICINE

## 2022-01-01 PROCEDURE — 87040 BLOOD CULTURE FOR BACTERIA: CPT | Performed by: HOSPITALIST

## 2022-01-01 PROCEDURE — 36415 COLL VENOUS BLD VENIPUNCTURE: CPT | Performed by: PHYSICIAN ASSISTANT

## 2022-01-01 PROCEDURE — 88341 IMHCHEM/IMCYTCHM EA ADD ANTB: CPT | Mod: 26

## 2022-01-01 PROCEDURE — 250N000013 HC RX MED GY IP 250 OP 250 PS 637: Performed by: HOSPITALIST

## 2022-01-01 PROCEDURE — 250N000025 HC SEVOFLURANE, PER MIN: Performed by: INTERNAL MEDICINE

## 2022-01-01 PROCEDURE — 250N000009 HC RX 250: Performed by: NURSE ANESTHETIST, CERTIFIED REGISTERED

## 2022-01-01 PROCEDURE — 83690 ASSAY OF LIPASE: CPT | Performed by: EMERGENCY MEDICINE

## 2022-01-01 PROCEDURE — 36415 COLL VENOUS BLD VENIPUNCTURE: CPT | Performed by: HOSPITALIST

## 2022-01-01 PROCEDURE — 74177 CT ABD & PELVIS W/CONTRAST: CPT

## 2022-01-01 PROCEDURE — 250N000011 HC RX IP 250 OP 636: Performed by: HOSPITALIST

## 2022-01-01 PROCEDURE — 99223 1ST HOSP IP/OBS HIGH 75: CPT | Performed by: INTERNAL MEDICINE

## 2022-01-01 PROCEDURE — 88307 TISSUE EXAM BY PATHOLOGIST: CPT | Mod: 26

## 2022-01-01 PROCEDURE — 99207 PR NO CHARGE LOS: CPT | Performed by: NURSE PRACTITIONER

## 2022-01-01 PROCEDURE — 710N000009 HC RECOVERY PHASE 1, LEVEL 1, PER MIN: Performed by: INTERNAL MEDICINE

## 2022-01-01 PROCEDURE — 99285 EMERGENCY DEPT VISIT HI MDM: CPT | Mod: 25

## 2022-01-01 PROCEDURE — 120N000001 HC R&B MED SURG/OB

## 2022-01-01 PROCEDURE — 258N000003 HC RX IP 258 OP 636: Performed by: INTERNAL MEDICINE

## 2022-01-01 PROCEDURE — 82248 BILIRUBIN DIRECT: CPT | Performed by: PHYSICIAN ASSISTANT

## 2022-01-01 PROCEDURE — 96361 HYDRATE IV INFUSION ADD-ON: CPT

## 2022-01-01 PROCEDURE — 258N000003 HC RX IP 258 OP 636: Performed by: ANESTHESIOLOGY

## 2022-01-01 PROCEDURE — 272N000001 HC OR GENERAL SUPPLY STERILE: Performed by: INTERNAL MEDICINE

## 2022-01-01 PROCEDURE — 81001 URINALYSIS AUTO W/SCOPE: CPT | Performed by: EMERGENCY MEDICINE

## 2022-01-01 PROCEDURE — 85610 PROTHROMBIN TIME: CPT | Performed by: PHYSICIAN ASSISTANT

## 2022-01-01 PROCEDURE — 250N000011 HC RX IP 250 OP 636: Performed by: PHYSICIAN ASSISTANT

## 2022-01-01 PROCEDURE — 77067 SCR MAMMO BI INCL CAD: CPT

## 2022-01-01 PROCEDURE — 250N000013 HC RX MED GY IP 250 OP 250 PS 637: Performed by: INTERNAL MEDICINE

## 2022-01-01 PROCEDURE — 83930 ASSAY OF BLOOD OSMOLALITY: CPT | Performed by: HOSPITALIST

## 2022-01-01 PROCEDURE — 999N000179 XR SURGERY CARM FLUORO LESS THAN 5 MIN W STILLS: Mod: TC

## 2022-01-01 PROCEDURE — 88342 IMHCHEM/IMCYTCHM 1ST ANTB: CPT | Mod: 26

## 2022-01-01 PROCEDURE — 88342 IMHCHEM/IMCYTCHM 1ST ANTB: CPT | Mod: TC | Performed by: INTERNAL MEDICINE

## 2022-01-01 PROCEDURE — 83935 ASSAY OF URINE OSMOLALITY: CPT | Performed by: HOSPITALIST

## 2022-01-01 PROCEDURE — 85027 COMPLETE CBC AUTOMATED: CPT | Performed by: HOSPITALIST

## 2022-01-01 PROCEDURE — C1876 STENT, NON-COA/NON-COV W/DEL: HCPCS | Performed by: INTERNAL MEDICINE

## 2022-01-01 PROCEDURE — 96374 THER/PROPH/DIAG INJ IV PUSH: CPT

## 2022-01-01 PROCEDURE — 250N000011 HC RX IP 250 OP 636: Performed by: NURSE ANESTHETIST, CERTIFIED REGISTERED

## 2022-01-01 PROCEDURE — 250N000009 HC RX 250: Performed by: PHYSICIAN ASSISTANT

## 2022-01-01 PROCEDURE — 258N000003 HC RX IP 258 OP 636: Performed by: PHYSICIAN ASSISTANT

## 2022-01-01 PROCEDURE — 80053 COMPREHEN METABOLIC PANEL: CPT | Performed by: HOSPITALIST

## 2022-01-01 PROCEDURE — 84295 ASSAY OF SERUM SODIUM: CPT | Performed by: INTERNAL MEDICINE

## 2022-01-01 DEVICE — STENT BILIARY ADVANIX NAVIFLEX BEND L7 CM OD1 M00534690: Type: IMPLANTABLE DEVICE | Site: BILE DUCT | Status: FUNCTIONAL

## 2022-01-01 RX ORDER — SODIUM CHLORIDE 9 MG/ML
INJECTION, SOLUTION INTRAVENOUS CONTINUOUS
Status: DISCONTINUED | OUTPATIENT
Start: 2022-01-01 | End: 2022-01-01

## 2022-01-01 RX ORDER — SODIUM CHLORIDE, SODIUM LACTATE, POTASSIUM CHLORIDE, CALCIUM CHLORIDE 600; 310; 30; 20 MG/100ML; MG/100ML; MG/100ML; MG/100ML
INJECTION, SOLUTION INTRAVENOUS CONTINUOUS
Status: DISCONTINUED | OUTPATIENT
Start: 2022-01-01 | End: 2022-01-01 | Stop reason: HOSPADM

## 2022-01-01 RX ORDER — MORPHINE SULFATE 20 MG/ML
5 SOLUTION ORAL
Status: DISCONTINUED | OUTPATIENT
Start: 2022-01-01 | End: 2022-01-01 | Stop reason: HOSPADM

## 2022-01-01 RX ORDER — NALOXONE HYDROCHLORIDE 0.4 MG/ML
0.2 INJECTION, SOLUTION INTRAMUSCULAR; INTRAVENOUS; SUBCUTANEOUS
Status: CANCELLED | OUTPATIENT
Start: 2022-01-01

## 2022-01-01 RX ORDER — IOPAMIDOL 755 MG/ML
80 INJECTION, SOLUTION INTRAVASCULAR ONCE
Status: COMPLETED | OUTPATIENT
Start: 2022-01-01 | End: 2022-01-01

## 2022-01-01 RX ORDER — HYDROMORPHONE HCL IN WATER/PF 6 MG/30 ML
0.2 PATIENT CONTROLLED ANALGESIA SYRINGE INTRAVENOUS EVERY 5 MIN PRN
Status: DISCONTINUED | OUTPATIENT
Start: 2022-01-01 | End: 2022-01-01 | Stop reason: HOSPADM

## 2022-01-01 RX ORDER — DRONABINOL 2.5 MG/1
2.5 CAPSULE ORAL 2 TIMES DAILY
Status: DISCONTINUED | OUTPATIENT
Start: 2022-01-01 | End: 2022-01-01 | Stop reason: HOSPADM

## 2022-01-01 RX ORDER — ONDANSETRON 2 MG/ML
4 INJECTION INTRAMUSCULAR; INTRAVENOUS EVERY 6 HOURS PRN
Status: DISCONTINUED | OUTPATIENT
Start: 2022-01-01 | End: 2022-01-01 | Stop reason: HOSPADM

## 2022-01-01 RX ORDER — ONDANSETRON 2 MG/ML
4 INJECTION INTRAMUSCULAR; INTRAVENOUS EVERY 6 HOURS PRN
Status: CANCELLED | OUTPATIENT
Start: 2022-01-01

## 2022-01-01 RX ORDER — LOSARTAN POTASSIUM 50 MG/1
50 TABLET ORAL DAILY
Status: DISCONTINUED | OUTPATIENT
Start: 2022-01-01 | End: 2022-01-01 | Stop reason: HOSPADM

## 2022-01-01 RX ORDER — LOPERAMIDE HCL 2 MG
1 CAPSULE ORAL DAILY
COMMUNITY

## 2022-01-01 RX ORDER — LIDOCAINE 40 MG/G
CREAM TOPICAL
Status: DISCONTINUED | OUTPATIENT
Start: 2022-01-01 | End: 2022-01-01 | Stop reason: HOSPADM

## 2022-01-01 RX ORDER — PIPERACILLIN SODIUM, TAZOBACTAM SODIUM 3; .375 G/15ML; G/15ML
3.38 INJECTION, POWDER, LYOPHILIZED, FOR SOLUTION INTRAVENOUS EVERY 6 HOURS
Status: DISCONTINUED | OUTPATIENT
Start: 2022-01-01 | End: 2022-01-01

## 2022-01-01 RX ORDER — NALOXONE HYDROCHLORIDE 0.4 MG/ML
0.4 INJECTION, SOLUTION INTRAMUSCULAR; INTRAVENOUS; SUBCUTANEOUS
Status: CANCELLED | OUTPATIENT
Start: 2022-01-01

## 2022-01-01 RX ORDER — VIT C/E/ZN/COPPR/LUTEIN/ZEAXAN 60 MG-6 MG
2 CAPSULE ORAL DAILY
Status: DISCONTINUED | OUTPATIENT
Start: 2022-01-01 | End: 2022-01-01 | Stop reason: HOSPADM

## 2022-01-01 RX ORDER — ONDANSETRON 4 MG/1
4 TABLET, ORALLY DISINTEGRATING ORAL EVERY 6 HOURS PRN
Status: DISCONTINUED | OUTPATIENT
Start: 2022-01-01 | End: 2022-01-01 | Stop reason: HOSPADM

## 2022-01-01 RX ORDER — DEXMEDETOMIDINE HYDROCHLORIDE 4 UG/ML
INJECTION, SOLUTION INTRAVENOUS PRN
Status: DISCONTINUED | OUTPATIENT
Start: 2022-01-01 | End: 2022-01-01

## 2022-01-01 RX ORDER — ONDANSETRON 4 MG/1
4 TABLET, ORALLY DISINTEGRATING ORAL EVERY 6 HOURS PRN
Status: CANCELLED | OUTPATIENT
Start: 2022-01-01

## 2022-01-01 RX ORDER — ONDANSETRON 4 MG/1
4 TABLET, ORALLY DISINTEGRATING ORAL EVERY 6 HOURS PRN
Qty: 30 TABLET | Refills: 0 | Status: SHIPPED | OUTPATIENT
Start: 2022-01-01

## 2022-01-01 RX ORDER — METOPROLOL SUCCINATE 50 MG/1
50 TABLET, EXTENDED RELEASE ORAL DAILY
Status: DISCONTINUED | OUTPATIENT
Start: 2022-01-01 | End: 2022-01-01

## 2022-01-01 RX ORDER — SODIUM CHLORIDE 1 G/1
1 TABLET ORAL
Status: DISCONTINUED | OUTPATIENT
Start: 2022-01-01 | End: 2022-01-01 | Stop reason: HOSPADM

## 2022-01-01 RX ORDER — ONDANSETRON 2 MG/ML
4 INJECTION INTRAMUSCULAR; INTRAVENOUS EVERY 30 MIN PRN
Status: DISCONTINUED | OUTPATIENT
Start: 2022-01-01 | End: 2022-01-01 | Stop reason: HOSPADM

## 2022-01-01 RX ORDER — ONDANSETRON 4 MG/1
4 TABLET, ORALLY DISINTEGRATING ORAL EVERY 6 HOURS PRN
Qty: 30 TABLET | Refills: 0 | Status: SHIPPED | OUTPATIENT
Start: 2022-01-01 | End: 2022-01-01

## 2022-01-01 RX ORDER — ONDANSETRON 4 MG/1
4 TABLET, ORALLY DISINTEGRATING ORAL EVERY 30 MIN PRN
Status: DISCONTINUED | OUTPATIENT
Start: 2022-01-01 | End: 2022-01-01 | Stop reason: HOSPADM

## 2022-01-01 RX ORDER — PROPOFOL 10 MG/ML
INJECTION, EMULSION INTRAVENOUS PRN
Status: DISCONTINUED | OUTPATIENT
Start: 2022-01-01 | End: 2022-01-01

## 2022-01-01 RX ORDER — TACROLIMUS 1 MG/G
OINTMENT TOPICAL 2 TIMES DAILY
COMMUNITY

## 2022-01-01 RX ORDER — FLUMAZENIL 0.1 MG/ML
0.2 INJECTION, SOLUTION INTRAVENOUS
Status: CANCELLED | OUTPATIENT
Start: 2022-01-01 | End: 2022-01-01

## 2022-01-01 RX ORDER — LIDOCAINE HYDROCHLORIDE 20 MG/ML
INJECTION, SOLUTION INFILTRATION; PERINEURAL PRN
Status: DISCONTINUED | OUTPATIENT
Start: 2022-01-01 | End: 2022-01-01

## 2022-01-01 RX ORDER — METOPROLOL SUCCINATE 50 MG/1
50 TABLET, EXTENDED RELEASE ORAL AT BEDTIME
Status: DISCONTINUED | OUTPATIENT
Start: 2022-01-01 | End: 2022-01-01 | Stop reason: HOSPADM

## 2022-01-01 RX ADMIN — SODIUM CHLORIDE, POTASSIUM CHLORIDE, SODIUM LACTATE AND CALCIUM CHLORIDE: 600; 310; 30; 20 INJECTION, SOLUTION INTRAVENOUS at 15:54

## 2022-01-01 RX ADMIN — SODIUM CHLORIDE TAB 1 GM 1 G: 1 TAB at 12:28

## 2022-01-01 RX ADMIN — SODIUM CHLORIDE TAB 1 GM 1 G: 1 TAB at 08:49

## 2022-01-01 RX ADMIN — SODIUM CHLORIDE: 9 INJECTION, SOLUTION INTRAVENOUS at 06:25

## 2022-01-01 RX ADMIN — METOPROLOL SUCCINATE 50 MG: 50 TABLET, EXTENDED RELEASE ORAL at 22:43

## 2022-01-01 RX ADMIN — SODIUM CHLORIDE 500 ML: 9 INJECTION, SOLUTION INTRAVENOUS at 15:07

## 2022-01-01 RX ADMIN — SODIUM CHLORIDE TAB 1 GM 1 G: 1 TAB at 18:58

## 2022-01-01 RX ADMIN — KETOTIFEN FUMARATE 1 DROP: 0.35 SOLUTION/ DROPS OPHTHALMIC at 21:30

## 2022-01-01 RX ADMIN — SODIUM CHLORIDE: 9 INJECTION, SOLUTION INTRAVENOUS at 17:42

## 2022-01-01 RX ADMIN — SODIUM CHLORIDE TAB 1 GM 1 G: 1 TAB at 18:53

## 2022-01-01 RX ADMIN — KETOTIFEN FUMARATE 1 DROP: 0.35 SOLUTION/ DROPS OPHTHALMIC at 22:30

## 2022-01-01 RX ADMIN — PIPERACILLIN AND TAZOBACTAM 3.38 G: 3; .375 INJECTION, POWDER, FOR SOLUTION INTRAVENOUS at 12:28

## 2022-01-01 RX ADMIN — Medication 2 CAPSULE: at 08:49

## 2022-01-01 RX ADMIN — IOPAMIDOL 80 ML: 755 INJECTION, SOLUTION INTRAVENOUS at 16:03

## 2022-01-01 RX ADMIN — LOSARTAN POTASSIUM 50 MG: 50 TABLET, FILM COATED ORAL at 09:15

## 2022-01-01 RX ADMIN — KETOTIFEN FUMARATE 1 DROP: 0.35 SOLUTION/ DROPS OPHTHALMIC at 08:35

## 2022-01-01 RX ADMIN — PIPERACILLIN AND TAZOBACTAM 3.38 G: 3; .375 INJECTION, POWDER, FOR SOLUTION INTRAVENOUS at 19:00

## 2022-01-01 RX ADMIN — LIDOCAINE HYDROCHLORIDE 30 MG: 20 INJECTION, SOLUTION INFILTRATION; PERINEURAL at 17:00

## 2022-01-01 RX ADMIN — DRONABINOL 2.5 MG: 2.5 CAPSULE ORAL at 20:22

## 2022-01-01 RX ADMIN — KETOTIFEN FUMARATE 1 DROP: 0.35 SOLUTION/ DROPS OPHTHALMIC at 09:15

## 2022-01-01 RX ADMIN — SODIUM CHLORIDE TAB 1 GM 1 G: 1 TAB at 17:58

## 2022-01-01 RX ADMIN — ONDANSETRON 4 MG: 2 INJECTION INTRAMUSCULAR; INTRAVENOUS at 17:06

## 2022-01-01 RX ADMIN — SODIUM CHLORIDE: 9 INJECTION, SOLUTION INTRAVENOUS at 03:36

## 2022-01-01 RX ADMIN — SODIUM CHLORIDE 63 ML: 900 INJECTION INTRAVENOUS at 16:03

## 2022-01-01 RX ADMIN — SUCCINYLCHOLINE CHLORIDE 70 MG: 20 INJECTION, SOLUTION INTRAMUSCULAR; INTRAVENOUS; PARENTERAL at 17:00

## 2022-01-01 RX ADMIN — METOPROLOL SUCCINATE 50 MG: 50 TABLET, EXTENDED RELEASE ORAL at 22:46

## 2022-01-01 RX ADMIN — LOSARTAN POTASSIUM 50 MG: 50 TABLET, FILM COATED ORAL at 09:19

## 2022-01-01 RX ADMIN — DEXMEDETOMIDINE HYDROCHLORIDE 12 MCG: 200 INJECTION INTRAVENOUS at 17:21

## 2022-01-01 RX ADMIN — PROPOFOL 50 MG: 10 INJECTION, EMULSION INTRAVENOUS at 16:53

## 2022-01-01 RX ADMIN — SODIUM CHLORIDE TAB 1 GM 1 G: 1 TAB at 12:09

## 2022-01-01 RX ADMIN — METOPROLOL SUCCINATE 50 MG: 50 TABLET, EXTENDED RELEASE ORAL at 22:29

## 2022-01-01 RX ADMIN — PIPERACILLIN AND TAZOBACTAM 3.38 G: 3; .375 INJECTION, POWDER, FOR SOLUTION INTRAVENOUS at 22:29

## 2022-01-01 RX ADMIN — SODIUM CHLORIDE: 9 INJECTION, SOLUTION INTRAVENOUS at 16:40

## 2022-01-01 RX ADMIN — SODIUM CHLORIDE TAB 1 GM 1 G: 1 TAB at 08:34

## 2022-01-01 RX ADMIN — SODIUM CHLORIDE TAB 1 GM 1 G: 1 TAB at 09:15

## 2022-01-01 RX ADMIN — PIPERACILLIN AND TAZOBACTAM 3.38 G: 3; .375 INJECTION, POWDER, FOR SOLUTION INTRAVENOUS at 00:50

## 2022-01-01 RX ADMIN — LOSARTAN POTASSIUM 50 MG: 50 TABLET, FILM COATED ORAL at 08:49

## 2022-01-01 RX ADMIN — Medication 2 CAPSULE: at 09:15

## 2022-01-01 RX ADMIN — PIPERACILLIN AND TAZOBACTAM 3.38 G: 3; .375 INJECTION, POWDER, FOR SOLUTION INTRAVENOUS at 06:34

## 2022-01-01 RX ADMIN — KETOTIFEN FUMARATE 1 DROP: 0.35 SOLUTION/ DROPS OPHTHALMIC at 08:50

## 2022-01-01 RX ADMIN — PROPOFOL 50 MG: 10 INJECTION, EMULSION INTRAVENOUS at 16:52

## 2022-01-01 RX ADMIN — PROPOFOL 80 MG: 10 INJECTION, EMULSION INTRAVENOUS at 17:00

## 2022-01-01 RX ADMIN — SODIUM CHLORIDE TAB 1 GM 1 G: 1 TAB at 12:04

## 2022-01-01 RX ADMIN — SODIUM CHLORIDE: 9 INJECTION, SOLUTION INTRAVENOUS at 15:14

## 2022-01-01 RX ADMIN — DEXMEDETOMIDINE HYDROCHLORIDE 8 MCG: 200 INJECTION INTRAVENOUS at 17:18

## 2022-01-01 RX ADMIN — Medication 2 CAPSULE: at 09:19

## 2022-01-01 RX ADMIN — KETOTIFEN FUMARATE 1 DROP: 0.35 SOLUTION/ DROPS OPHTHALMIC at 20:23

## 2022-01-01 RX ADMIN — PIPERACILLIN AND TAZOBACTAM 3.38 G: 3; .375 INJECTION, POWDER, FOR SOLUTION INTRAVENOUS at 04:09

## 2022-01-01 ASSESSMENT — ACTIVITIES OF DAILY LIVING (ADL)
ADLS_ACUITY_SCORE: 43
ADLS_ACUITY_SCORE: 43
ADLS_ACUITY_SCORE: 35
ADLS_ACUITY_SCORE: 37
ADLS_ACUITY_SCORE: 43
ADLS_ACUITY_SCORE: 35
ADLS_ACUITY_SCORE: 37
ADLS_ACUITY_SCORE: 41
ADLS_ACUITY_SCORE: 41
ADLS_ACUITY_SCORE: 43
ADLS_ACUITY_SCORE: 41
ADLS_ACUITY_SCORE: 35
ADLS_ACUITY_SCORE: 43
ADLS_ACUITY_SCORE: 43
ADLS_ACUITY_SCORE: 33
ADLS_ACUITY_SCORE: 43
ADLS_ACUITY_SCORE: 41
ADLS_ACUITY_SCORE: 35
ADLS_ACUITY_SCORE: 41
ADLS_ACUITY_SCORE: 44
ADLS_ACUITY_SCORE: 35
ADLS_ACUITY_SCORE: 35
ADLS_ACUITY_SCORE: 41
ADLS_ACUITY_SCORE: 44
ADLS_ACUITY_SCORE: 43
ADLS_ACUITY_SCORE: 35
ADLS_ACUITY_SCORE: 35
ADLS_ACUITY_SCORE: 43
ADLS_ACUITY_SCORE: 35
ADLS_ACUITY_SCORE: 41
ADLS_ACUITY_SCORE: 44
ADLS_ACUITY_SCORE: 41
ADLS_ACUITY_SCORE: 43

## 2022-01-01 ASSESSMENT — ENCOUNTER SYMPTOMS
ORTHOPNEA: 0
RHINORRHEA: 1
ABDOMINAL PAIN: 1

## 2022-09-19 PROBLEM — K86.89 PANCREATIC MASS: Status: ACTIVE | Noted: 2022-01-01

## 2022-09-19 PROBLEM — E87.1 HYPONATREMIA: Status: ACTIVE | Noted: 2022-01-01

## 2022-09-19 NOTE — ED PROVIDER NOTES
"ED ATTENDING PHYSICIAN NOTE:   I evaluated this patient in conjunction with Surendra Jimenez PA-C  I have participated in the care of the patient and personally performed key elements of the history, exam, and medical decision making.      HPI:   Mandy Jack is a 88 year old female presenting to the emergency department with generalized weakness.  She has also developed intermittent abdominal pain.  She initially felt that the weakness was associated with her COVID-19 diagnosis however has persisted.     EXAM:   BP (!) 153/75   Pulse 83   Temp 98.1  F (36.7  C) (Oral)   Resp 16   Ht 1.575 m (5' 2\")   Wt 60.8 kg (134 lb)   SpO2 98%   BMI 24.51 kg/m    General: Alert, interactive  Head:  Scalp is atraumatic  Eyes:  The pupils are equal, round, and reactive to light    EOM's intact    No scleral icterus  ENT:      Nose:  The external nose is normal  Ears:  External ears are normal     Neck:  Normal range of motion.      There is no rigidity.    Trachea is in the midline         CV:  Regular rate and rhythm    No murmur   Resp:  Breath sounds are clear bilaterally    Non-labored, no retractions or accessory muscle use      GI:  Abdomen is soft, no distension. Epigastric tenderness.      MS:  Normal strength in all 4 extremities  Skin:  Warm and dry, No rash or lesions noted.  Neuro: Strength 5/5 x4.      GCS: 15  Psych:  Awake. Alert.  Normal affect.      Appropriate interactions.    RESULTS:    Laboratory:  Labs Ordered and Resulted from Time of ED Arrival to Time of ED Departure   COMPREHENSIVE METABOLIC PANEL - Abnormal       Result Value    Sodium 120 (*)     Potassium 3.8      Chloride 87 (*)     Carbon Dioxide (CO2) 21      Anion Gap 12      Urea Nitrogen 12      Creatinine 0.62      Calcium 9.8      Glucose 123 (*)     Alkaline Phosphatase 500 (*)      (*)      (*)     Protein Total 6.9      Albumin 3.5      Bilirubin Total 5.6 (*)     GFR Estimate 85     LIPASE - Abnormal    Lipase 690 (*) "    BILIRUBIN DIRECT - Abnormal    Bilirubin Direct 4.4 (*)    CBC WITH PLATELETS AND DIFFERENTIAL - Abnormal    WBC Count 7.1      RBC Count 3.63 (*)     Hemoglobin 11.4 (*)     Hematocrit 31.8 (*)     MCV 88      MCH 31.4      MCHC 35.8      RDW 12.9      Platelet Count 286      % Neutrophils 65      % Lymphocytes 22      % Monocytes 13      % Eosinophils 0      % Basophils 0      % Immature Granulocytes 0      NRBCs per 100 WBC 0      Absolute Neutrophils 4.6      Absolute Lymphocytes 1.6      Absolute Monocytes 0.9      Absolute Eosinophils 0.0      Absolute Basophils 0.0      Absolute Immature Granulocytes 0.0      Absolute NRBCs 0.0     ROUTINE UA WITH MICROSCOPIC REFLEX TO CULTURE   COVID-19 VIRUS (CORONAVIRUS) BY PCR     Imaging:  CT Abdomen Pelvis w Contrast   Preliminary Result   IMPRESSION:    1.  New finding of a prominent hypodense lesion at the pancreas head   worrisome for neoplasm associated with severe biliary ductal   dilatation. Associated main pancreatic duct dilatation and more distal   pancreatic parenchymal atrophy identified. Recommend oncologic workup.   2.  Diffuse nodularity throughout the abdomen and pelvis along the   omentum, mesentery, peritoneal reflections consistent with   carcinomatosis and/or malignant adenopathy. Trace pelvic fluid with   some peripheral nodularity may be malignant trace ascites.   3.  There may be a mild degree of small bowel ileus.   4.  A small but new lucency at the superior endplate of L4 vertebral   body towards the right is indeterminant. A metastatic lesion remains   in the differential.               MEDICAL DECISION MAKING/ASSESSMENT AND PLAN:    Following presentation history physical examination were performed, the above work-up was undertaken.  Patient is noted to have elevated liver function tests as well as a new mass with concerns for newly diagnosed pancreatic malignancy.  Given these findings we believe she would benefit from hospitalization, GI  consultation, and likely oncology consultation.  Patient was informed of all the findings.     DIAGNOSIS:     ICD-10-CM    1. Pancreatic mass  K86.89 Case Request: ENDOSCOPIC RETROGRADE CHOLANGIOPANCREATOGRAPHY, COMPLEX  Endoscopic ultrasound with pancreatic biopsy     Case Request: ENDOSCOPIC RETROGRADE CHOLANGIOPANCREATOGRAPHY, COMPLEX  Endoscopic ultrasound with pancreatic biopsy   2. Hyponatremia  E87.1             DISPOSITION:    Admit to the care of Dr. Anderson       9/19/2022  [facility] EMERGENCY DEPT    Scribe Disclosure:  I, Alondra Devries, am serving as a scribe at 4:48 PM on 9/19/2022 to document services personally performed by Surendra Jimenez PA-C based on my observations and the provider's statements to me.     Trigger, Porter Lobo MD  09/19/22 [id number]

## 2022-09-19 NOTE — ED PROVIDER NOTES
History     Chief Complaint:  Generalized Weakness       HPI   Mandy Jack is a 88 year old female with a hx of hypertension, presents the ER for evaluation of generalized weakness since the end of August when she was diagnosed with COVID-19.  She was sneezing, congested, losing her sense of taste and smell.  She never had this settling in her chest she reports.  She has no cough, chest pain, shortness of breath or other.  She remains fatigued however has developed abdominal pain intermittently for the past week.  It comes and goes, is located around her umbilicus, does not radiate, it is associated with her symptoms attributable to COVID she says.  She took Paxlovid.  No changes in stooling.  Her urine is dark.    ROS:  Review of Systems   HENT: Positive for rhinorrhea.    Gastrointestinal: Positive for abdominal pain.      All other systems reviewed and are negative.    Allergies:  Chlorthalidone  Fentanyl  Meperidine  Nitrofurantoin  Septra [Sulfamethoxazole W/Trimethoprim]  Tolterodine     Medications:    Calcium Carbonate-Vit D-Min (CALTRATE PLUS OR)  hydrochlorothiazide (MICROZIDE) 12.5 MG capsule  ketotifen (ZADITOR) 0.025 % ophthalmic solution  loperamide (IMODIUM) 2 MG capsule  losartan (COZAAR) 50 MG tablet  metoprolol (TOPROL-XL) 50 MG 24 hr tablet  Multiple Vitamins-Minerals (PRESERVISION AREDS 2 PO)  multivitamin, therapeutic (THERA-VIT) TABS  spironolactone (ALDACTONE) 25 MG tablet  tacrolimus (PROTOPIC) 0.1 % external ointment  simvastatin (ZOCOR) 20 MG tablet        Past Medical History:    Past Medical History:   Diagnosis Date     Degeneration of cervical intervertebral disc      Hypertension 12/20/2016     Mixed hyperlipidemia      Other chronic nonalcoholic liver disease      Other premature beats      Recurrent cystitis      Rosacea      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     Patient Active Problem List   Diagnosis     PVC's (premature ventricular contractions)     Hyponatremia     Pancreatic  mass        Past Surgical History:    Past Surgical History:   Procedure Laterality Date     GENITOURINARY SURGERY      bladder suspension     GYN SURGERY      total abdominal hysterectomy     GYN SURGERY      oophorectomy     PHACOEMULSIFICATION CLEAR CORNEA WITH TORIC INTRAOCULAR LENS IMPLANT Left 5/20/2015    Procedure: PHACOEMULSIFICATION CLEAR CORNEA WITH TORIC INTRAOCULAR LENS IMPLANT;  Surgeon: Marquise Hung MD;  Location: [facility]     PHACOEMULSIFICATION CLEAR CORNEA WITH TORIC INTRAOCULAR LENS IMPLANT Right 6/10/2015    Procedure: PHACOEMULSIFICATION CLEAR CORNEA WITH TORIC INTRAOCULAR LENS IMPLANT;  Surgeon: Marquise Hung MD;  Location: [facility]        Family History:    family history includes Diabetes in her mother.    Social History:   reports that she has never smoked. She has never used smokeless tobacco. She reports current alcohol use.  PCP: Vincenzo Hassan     Physical Exam     Patient Vitals for the past 24 hrs:   BP Temp Temp src Pulse Resp SpO2   09/19/22 1726 (!) 157/76 -- -- 83 16 97 %   09/19/22 1155 (!) 155/76 98.1  F (36.7  C) Oral 74 18 99 %        Physical Exam  General: Well appearing, pleasant female, resting on exam bed, mildly jaundiced  HEENT: No evidence of trauma.  Conjunctive are clear.  Extraocular eye movements intact.  Neck range of motion intact.  Nose and throat clear.  Respiratory: Good breath sounds bilaterally  Cardiovascular: Normal rate and rhythm   Gastrointestinal: Soft, nontender.   Musculoskeletal: Atraumatic  Skin: Exposed skin clear.  Neurologic: Alert.  Psych:  Patient is cooperative, with normal affect.      Emergency Department Course   ECG:  none    Imaging:  CT Abdomen Pelvis w Contrast   Preliminary Result   IMPRESSION:    1.  New finding of a prominent hypodense lesion at the pancreas head   worrisome for neoplasm associated with severe biliary ductal   dilatation. Associated main pancreatic duct dilatation and more distal   pancreatic parenchymal atrophy  identified. Recommend oncologic workup.   2.  Diffuse nodularity throughout the abdomen and pelvis along the   omentum, mesentery, peritoneal reflections consistent with   carcinomatosis and/or malignant adenopathy. Trace pelvic fluid with   some peripheral nodularity may be malignant trace ascites.   3.  There may be a mild degree of small bowel ileus.   4.  A small but new lucency at the superior endplate of L4 vertebral   body towards the right is indeterminant. A metastatic lesion remains   in the differential.               Laboratory:  Labs Ordered and Resulted from Time of ED Arrival to Time of ED Departure   COMPREHENSIVE METABOLIC PANEL - Abnormal       Result Value    Sodium 120 (*)     Potassium 3.8      Chloride 87 (*)     Carbon Dioxide (CO2) 21      Anion Gap 12      Urea Nitrogen 12      Creatinine 0.62      Calcium 9.8      Glucose 123 (*)     Alkaline Phosphatase 500 (*)      (*)      (*)     Protein Total 6.9      Albumin 3.5      Bilirubin Total 5.6 (*)     GFR Estimate 85     LIPASE - Abnormal    Lipase 690 (*)    BILIRUBIN DIRECT - Abnormal    Bilirubin Direct 4.4 (*)    CBC WITH PLATELETS AND DIFFERENTIAL - Abnormal    WBC Count 7.1      RBC Count 3.63 (*)     Hemoglobin 11.4 (*)     Hematocrit 31.8 (*)     MCV 88      MCH 31.4      MCHC 35.8      RDW 12.9      Platelet Count 286      % Neutrophils 65      % Lymphocytes 22      % Monocytes 13      % Eosinophils 0      % Basophils 0      % Immature Granulocytes 0      NRBCs per 100 WBC 0      Absolute Neutrophils 4.6      Absolute Lymphocytes 1.6      Absolute Monocytes 0.9      Absolute Eosinophils 0.0      Absolute Basophils 0.0      Absolute Immature Granulocytes 0.0      Absolute NRBCs 0.0     ROUTINE UA WITH MICROSCOPIC REFLEX TO CULTURE   COVID-19 VIRUS (CORONAVIRUS) BY PCR   OSMOLALITY   OSMOLALITY, RANDOM URINE   SODIUM RANDOM URINE   INR        Interventions:  Medications   0.9% sodium chloride BOLUS (0 mLs Intravenous  Stopped 9/19/22 1700)   iopamidol (ISOVUE-370) solution 80 mL (80 mLs Intravenous Given 9/19/22 1603)   Saline Flush (63 mLs Intravenous Given 9/19/22 1603)        Impression & Plan      Medical Decision Making:  Mandy Jack is a 88 year old female presents emergency room today for evaluation of weakness for the past couple weeks and intermittent abdominal pain.  See HPI.  Her vitals are unremarkable.  She has mild abdominal tenderness.  CBC reveals mild anemia.  CMP reveals elevated liver enzymes and hyponatremia.  Her bilirubin is elevated.  She went for a CT of her abdomen and pelvis that revealed concerns for a metastatic pancreatic neoplasm.  At this point, I staffed case with Dr. Brush.  I talked with internal medicine Dr. Anderson who agreed to admit the patient. He had me call Dr Means to discuss if the pt would need emergent ERCP and he did not feel this was needed but rather recommended ercp and eus. She was stable at time admission and given the interventions above. I left the case with my attending as I came to the end of my shift.    Diagnosis:    ICD-10-CM    1. Pancreatic mass  K86.89 Case Request: ENDOSCOPIC RETROGRADE CHOLANGIOPANCREATOGRAPHY, COMPLEX  Endoscopic ultrasound with pancreatic biopsy     Case Request: ENDOSCOPIC RETROGRADE CHOLANGIOPANCREATOGRAPHY, COMPLEX  Endoscopic ultrasound with pancreatic biopsy   2. Hyponatremia  E87.1         Discharge Medications:  New Prescriptions    No medications on file        9/19/2022   Surendra Jimenez PA-C Cyr, Matthew R, PA-C  09/19/22 174

## 2022-09-19 NOTE — PHARMACY-ADMISSION MEDICATION HISTORY
Pharmacy Medication History  Admission medication history interview status for the 9/19/2022  admission is complete. See EPIC admission navigator for prior to admission medications     Location of Interview: Patient room  Medication history sources: Patient, Patient's family/friend (daughter), Surescripts and Patient's home med list    Significant changes made to the medication list:  Added Ketotifen, loperamide, preservision, tacrolimus  Removed Magnesium, ASA  Changed directions of losartan, spirinolactone    In the past week, patient estimated taking medication this percent of the time: greater than 90%    Additional medication history information:   Pt reports that she was in urgent care for covid about 2 weeks ago and was prescribed paxlovid. Pt was told to stop taking simvastatin at that time. Will leave sticky note to MD - perhaps was only meant to pause while taking paxlovid.    Medication reconciliation completed by provider prior to medication history? No    Time spent in this activity: 25 minutes    Prior to Admission medications    Medication Sig Last Dose Taking? Auth Provider Long Term End Date   Calcium Carbonate-Vit D-Min (CALTRATE PLUS OR) Take by mouth daily 600 mg- 400 units 9/19/2022 at Unknown time Yes Reported, Patient     hydrochlorothiazide (MICROZIDE) 12.5 MG capsule Take 12.5 mg by mouth every morning 9/19/2022 at Unknown time Yes Reported, Patient Yes    ketotifen (ZADITOR) 0.025 % ophthalmic solution 1 drop 2 times daily 9/19/2022 at Unknown time Yes Unknown, Entered By History Yes    loperamide (IMODIUM) 2 MG capsule Take 1 mg by mouth daily 9/19/2022 at Unknown time Yes Unknown, Entered By History     losartan (COZAAR) 50 MG tablet Take 50 mg by mouth daily 9/19/2022 at Unknown time Yes Reported, Patient Yes    metoprolol (TOPROL-XL) 50 MG 24 hr tablet Take 50 mg by mouth 9/19/2022 at Unknown time Yes Reported, Patient Yes    Multiple Vitamins-Minerals (PRESERVISION AREDS 2 PO) Take 2  capsules by mouth daily 9/19/2022 at Unknown time Yes Unknown, Entered By History     multivitamin, therapeutic (THERA-VIT) TABS Take 1 tablet by mouth daily 9/19/2022 at Unknown time Yes Reported, Patient     spironolactone (ALDACTONE) 25 MG tablet Take 0.5 tab (12.5mg) every other day alternating with 1 tab (25mg) by mouth every other day for hypertension. Last took 0.5 tab (12.5mg on 9/19/22). 9/19/2022 at Unknown time Yes Reported, Patient Yes    tacrolimus (PROTOPIC) 0.1 % external ointment Apply topically 2 times daily 9/19/2022 at Unknown time Yes Unknown, Entered By History     simvastatin (ZOCOR) 20 MG tablet Take by mouth At Bedtime   Reported, Patient Yes        The information provided in this note is only as accurate as the sources available at the time of update(s)

## 2022-09-19 NOTE — H&P
[facility]    History and Physical - Hospitalist Service       Date of Admission:  9/19/2022    Assessment & Plan        Mandy Jack is a 88 year old female who has a medical history which includes hypertension, hyperlipidemia, venous insufficiency status post venous seal and sclerotherapy to right lower extremity, diverticulitis, history of atypical chest pain with prior CT coronary angiogram which looked excellent with a calcium score of 0, recently diagnosed COVID-19 infection on 09/03/2022 and has recently completed the Paxlovid course presented to the ED with a chief complaint of abdominal pain and generalized weakness      1) generalized weakness  Transaminitis  due to likely due  pancreatic head mass with biliary obstruction along with carcinomatosis and possible intraperitoneal metastatic disease along with bony involvement.  -On admission presented with abdominal pain and generalized weakness and he had lab work done which was consistent with LFT T's which were elevated including elevated total bilirubin of 5.6 with direct of 4.4 and alkaline phosphatase of 500 and ALT of 914 with AST of 403 consistent with obstruction and her lipase is also elevated at 690  -Patient underwent CT scan of the abdomen and pelvis with contrastNew finding of a prominent hypodense lesion at the pancreas head worrisome for neoplasm associated with severe biliary ductaldilatation. Associated main pancreatic duct dilatation and more distal pancreatic parenchymal atrophy identified. Diffuse nodularity throughout the abdomen and pelvis along the omentum, mesentery, peritoneal reflections consistent with carcinomatosis and/or malignant adenopathy. Trace pelvic fluid with some peripheral nodularity may be malignant trace ascites.  -GI team has been consulted and will need further work-up Including EUS+-ERCP and spoke with Dr Mccoy in person and her lipase is elevated due to biliary obstruction and no  need to treat as pancreatitis and patient and family agrees for work up and we will feed her today , start empiric zosyn ( no concern for cholangitis at this time but given her weakness will start for now and blood culture done ) and NPO after might         2) hyponatremia likely due to hydrochlorothiazide use  -On admission presented with generalized weakness and does have hyponatremia in the past and is on hydrochlorothiazide 12.5 mg daily and her sodium today is 120 and was given 500 cc IV fluid bolus in the ED  -serum osmolarity is low at 257 and urine osmolality is 285 and urine sodium is 58  -We will put her on fluid restriction now and will avoid rapid correction and check sodium every 4 hours  And will start salt tablets     3) hypertension  -Her home medication regimen includes losartan 50 mg daily, metoprolol 50 mg long-acting, hydrochlorothiazide 12.5 mg daily  -We will continue with her metoprolol and losartan but will hold hydrochlorothiazide given her hyponatremia    4) hyperlipidemia  -Given her elevated LFTs we will hold simvastatin    5) incidental finding of A small but new lucency at the superior endplate of L4 vertebralbody towards the right is indeterminant.   -A metastatic lesion remainsin the differential.  -Patient does not have any back pain and was made aware of the same and further work-up of likely pancreatic malignancy as above    6) recent COVID-19 infection  -She has had COVID-19 vaccine and boosters  -Patient tested positive on 9/3 at home test and has recently completed course of paxlovoid treatment    7) macular degeneration  -we will continue with home meds         Diet:  regular  Now and NPO after midnight   DVT Prophylaxis: Pneumatic Compression Devices  Barnett Catheter: Not present  Central Lines: None  Cardiac Monitoring: None  Code Status:   Full code and discussed with patient     Clinically Significant Risk Factors Present on Admission         # Hyponatremia: Na = 120 mmol/L  (Ref range: 133 - 144 mmol/L) on admission, will monitor as appropriate         # Hypertension: home medication list includes antihypertensive(s)          Disposition Plan      Expected Discharge Date: 09/21/2022        Discharge Comments: gi work up   The patient's care was discussed with the Bedside Nurse, Patient, Patient's Family and GI Consultant. Spoke with  yair and daughters after patient gave permission    Meet Anderson MD  Hospitalist Service  [facility]  Securely message with the Vocera Web Console (learn more here)  Text page via [employer] Paging/Directory     I am on admitting shift and her care in the morning will be taken over by hospital medicine team    ______________________________________________________________________    Chief Complaint     Weakness and abdominal pain and dark urine    History is obtained from the patient, EMR review and talking to ed doctor     History of Present Illness   Mandy Jack is a 88 year old female who has a medical history which includes hypertension, hyperlipidemia, venous insufficiency status post venous seal and sclerotherapy to right lower extremity, diverticulitis, history of atypical chest pain with prior CT coronary angiogram which looked excellent with a calcium score of 0, recently diagnosed COVID-19 infection on 09/03/2022 and has recently completed the Paxlovid course presented to the ED with a chief complaint of abdominal pain and generalized weakness which has been going on since she got COVID-19 infection and it has not gotten better and she also complains of upper right upper quadrant pain and her pain is mostly around umbilicus.  She denies any diarrhea and her urine is dark.  She still has some runny nose    Patient denies any fever, chills, cough, nausea, vomiting, chest pain, shortness of breath, tingling or numbness anywhere else in the body    In the ER she had lab work done which was significant for sodium of  120, chloride of 87, creatinine was normal at 0.62, ALT was 914, AST of 403, alkaline phosphatase of 500, total bilirubin of 5.6 with direct bilirubin of 4.4 and her lipase was elevated at 690    CBC was done which showed WBC count of 7.1, hemoglobin of 11.4 and platelet count of 286    She had CT scan of the abdomen and pelvis done which was concerning for new finding of prominent hypodense lesion at the pancreatic head worrisome for neoplasm associated with severe biliary ductal dilatation and associated main pancreatic duct dilatation and more distal pancreatic parenchymal atrophy, diffuse nodularity throughout the abdomen and pelvis along with omentum, mesentery, peritoneum reflections consistent with carcinomatosis and or malignant adenopathy, trace pelvic fluid with some peripheral nodularity may be malignant trace ascites, mild degree of small bowel ileus and small but new lucency at the superior endplate of L4 vertebral body towards the right is indeterminate and metastatic lesion remains in the differential    Patient was given 500 cc normal saline bolus in the ED and I was called for an admit and I requested that GI be consulted.    She had EGD done by dr miranda in 2019 and was normal       Review of Systems    The 10 point Review of Systems is negative other than noted in the HPI or here.     Past Medical History    I have reviewed this patient's medical history and updated it with pertinent information if needed.   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  Past Surgical  History:   Procedure Laterality Date     GENITOURINARY SURGERY      bladder suspension     GYN SURGERY      total abdominal hysterectomy     GYN SURGERY      oophorectomy     PHACOEMULSIFICATION CLEAR CORNEA WITH TORIC INTRAOCULAR LENS IMPLANT Left 2015    Procedure: PHACOEMULSIFICATION CLEAR CORNEA WITH TORIC INTRAOCULAR LENS IMPLANT;  Surgeon: Marquise Hung MD;  Location: [facility]     PHACOEMULSIFICATION CLEAR CORNEA WITH TORIC INTRAOCULAR LENS IMPLANT Right 6/10/2015    Procedure: PHACOEMULSIFICATION CLEAR CORNEA WITH TORIC INTRAOCULAR LENS IMPLANT;  Surgeon: Marquise Hung MD;  Location: [facility]       Social History   I have reviewed this patient's social history and updated it with pertinent information if needed.  Social History     Tobacco Use     Smoking status: Never Smoker     Smokeless tobacco: Never Used   Substance Use Topics     Alcohol use: Yes       Family History   I have reviewed this patient's family history and updated it with pertinent information if needed.  Family History   Problem Relation Age of Onset     Diabetes Mother        Prior to Admission Medications   Prior to Admission Medications   Prescriptions Last Dose Informant Patient Reported? Taking?   Calcium Carbonate-Vit D-Min (CALTRATE PLUS OR) 2022 at Unknown time  Yes Yes   Sig: Take by mouth daily 600 mg- 400 units   Multiple Vitamins-Minerals (PRESERVISION AREDS 2 PO) 2022 at Unknown time  Yes Yes   Sig: Take 2 capsules by mouth daily   hydrochlorothiazide (MICROZIDE) 12.5 MG capsule 2022 at Unknown time  Yes Yes   Sig: Take 12.5 mg by mouth every morning   ketotifen (ZADITOR) 0.025 % ophthalmic solution 2022 at Unknown time  Yes Yes   Si drop 2 times daily   loperamide (IMODIUM) 2 MG capsule 2022 at Unknown time  Yes Yes   Sig: Take 1 mg by mouth daily   losartan (COZAAR) 50 MG tablet 2022 at Unknown time  Yes Yes   Sig: Take 50 mg by mouth daily   metoprolol (TOPROL-XL) 50 MG 24 hr tablet  9/19/2022 at Unknown time  Yes Yes   Sig: Take 50 mg by mouth   multivitamin, therapeutic (THERA-VIT) TABS 9/19/2022 at Unknown time  Yes Yes   Sig: Take 1 tablet by mouth daily   simvastatin (ZOCOR) 20 MG tablet   Yes No   Sig: Take by mouth At Bedtime   spironolactone (ALDACTONE) 25 MG tablet 9/19/2022 at Unknown time  Yes Yes   Sig: Take 0.5 tab (12.5mg) every other day alternating with 1 tab (25mg) by mouth every other day for hypertension. Last took 0.5 tab (12.5mg on 9/19/22).   tacrolimus (PROTOPIC) 0.1 % external ointment 9/19/2022 at Unknown time  Yes Yes   Sig: Apply topically 2 times daily      Facility-Administered Medications: None     Allergies   Allergies   Allergen Reactions     Chlorthalidone      Fentanyl      Low HR during cataract surgery     Meperidine      Nitrofurantoin      Septra [Sulfamethoxazole W/Trimethoprim]      Tolterodine        Physical Exam   Vital Signs: Temp: 98.1  F (36.7  C) Temp src: Oral BP: (!) 155/76 Pulse: 74   Resp: 18 SpO2: 99 % O2 Device: None (Room air)    Weight: 0 lbs 0 oz        General: Patient appears comfortable and in no acute distress.  HEENT: Head is atraumatic, normocephalic.  Pupils are equal, round and reactive to light.  + scleral icterus. Oral mucosa is moist   Neck: Neck is supple and No Lymphadenopathy   Respiratory: Lungs are clear to auscultation bilaterally with no wheeze or crackles   Cardiovascular: Regular rate , S1 and S2 normal with no murmer or rubs or gallops  Abdomen:   soft , non tender , non distended and bowel sound present   Skin: No skin rashes or lesions to inspection or palpation.  Neurologic: Higher functions are within normal limits. No obvious defects in speech, language and memory. No facial droop  Musculoskeletal: Normal Range of motion over upper and lower extremities bilaterally   Psychiatric: cooperative     Data   Data reviewed today: I reviewed all medications, new labs and imaging results over the last 24 hours. I personally  reviewed    CT scan of the abdomen and pelvis    IMPRESSION:   1.  New finding of a prominent hypodense lesion at the pancreas head  worrisome for neoplasm associated with severe biliary ductal  dilatation. Associated main pancreatic duct dilatation and more distal  pancreatic parenchymal atrophy identified. Recommend oncologic workup.  2.  Diffuse nodularity throughout the abdomen and pelvis along the  omentum, mesentery, peritoneal reflections consistent with  carcinomatosis and/or malignant adenopathy. Trace pelvic fluid with  some peripheral nodularity may be malignant trace ascites.  3.  There may be a mild degree of small bowel ileus.  4.  A small but new lucency at the superior endplate of L4 vertebral  body towards the right is indeterminant. A metastatic lesion remains  in the differential.      Recent Labs   Lab 09/19/22  1502   WBC 7.1   HGB 11.4*   MCV 88      *   POTASSIUM 3.8   CHLORIDE 87*   CO2 21   BUN 12   CR 0.62   ANIONGAP 12   JAVIER 9.8   *   ALBUMIN 3.5   PROTTOTAL 6.9   BILITOTAL 5.6*   ALKPHOS 500*   *   *   LIPASE 690*     Most Recent 3 CBC's:Recent Labs   Lab Test 09/19/22  1502 08/16/19  0739   WBC 7.1 12.2*   HGB 11.4* 14.6   MCV 88 89    346     Most Recent 3 BMP's:Recent Labs   Lab Test 09/19/22  1502 08/16/19  0739 11/21/18  1133   * 132* 131*   POTASSIUM 3.8 3.7 4.1   CHLORIDE 87* 99 99   CO2 21 22 26   BUN 12 12 15   CR 0.62 0.86 1.06   ANIONGAP 12 11 10.1   JAVIER 9.8 9.5 10.5   * 109* 104     Most Recent 2 LFT's:Recent Labs   Lab Test 09/19/22  1502 08/16/19  0739   * 19   * 21   ALKPHOS 500* 77   BILITOTAL 5.6* 0.8     7.1    \    11.4 (L)    /    286   N 65    L N/A    120 (L)    87 (L)    12 /   ------------------------------------ 123 (H)    (HH)    (H)    (H)   ALB 3.5   Ca 9.8  3.8    21    0.62 \    % RETIC N/A    LDH N/A  Troponin N/A    BNP N/A    CK N/A  INR N/A   PTT N/A    D-dimer N/A     Fibrinogen N/A    Antithrombin N/A  Ferritin N/A  CRP N/A    IL-6 N/A  Recent Results (from the past 24 hour(s))   CT Abdomen Pelvis w Contrast    Narrative    CT ABDOMEN AND PELVIS WITH CONTRAST 9/19/2022 4:11 PM    CLINICAL HISTORY: Epigastric pain, jaundice.    TECHNIQUE: CT scan of the abdomen and pelvis was performed following  injection of IV contrast. Multiplanar reformats were obtained. Dose  reduction techniques were used.  CONTRAST:  80 mL isovue 370    COMPARISON: CT chest, abdomen and pelvis 8/16/2019.    FINDINGS:   LOWER CHEST: Normal.    HEPATOBILIARY: Interval development of severe intrahepatic biliary  ductal dilatation. This leads to abrupt decompression at the pancreas  head, see pancreas section below. No focal hepatic lesion can be seen.  The gallbladder appears to be severely contracted.    PANCREAS: New finding of a hypodense pancreas head region hypodense  structure worrisome for a mass that is approximately 2.7 x 2.4 cm in  transverse plane on series 3 image 71. Craniocaudal length of  approximately 3 cm on coronal series 4 image 25. The biliary ductal  dilatation leads to this region. A new finding of diffuse prominent  main pancreatic duct dilatation of 8 mm on series 3 image 59. The  pancreas parenchyma including the neck, body, and tail is diffusely  atrophic. The previously mentioned mass directly contacts the medial  wall of the proximal duodenum, and may just contact the undersurface  of the adjacent main portal vein, coronal series 4 image 28. This also  surrounds the course of the hepatic artery.    SPLEEN: No worrisome splenic lesion. Incidental calcification noted.    ADRENAL GLANDS: Stable mild bilateral adrenal thickening.    KIDNEYS/BLADDER: No hydronephrosis. No worrisome renal parenchymal  lesion. No urolithiasis. A few tiny cysts not requiring specific  imaging follow-up. Bladder is unremarkable.    BOWEL: No complete small bowel obstruction. A few small bowel loops  are  mildly distended.    PELVIC ORGANS: Trace pelvic fluid with some peripheral nodularity and  enhancement, for example, towards the right on series 3 image 156.    ADDITIONAL FINDINGS: Numerous areas of soft tissue nodularity along  the omentum, mesentery, and peritoneal reflections. An example lesion  along the right omentum is approximately 1.0 x 0.7 cm image 69. There  are innumerable other examples. There are several enlarged lymph nodes  near the pancreas at the upper abdomen. A portal caval example is 1.2  cm image 53. There are other adjacent small examples. Retroperitoneal  lymph nodes appear to be small at the lower abdomen and pelvis.  Several nodules versus lymph nodes at the mesentery as well.    MUSCULOSKELETAL: Tiny locule of fluid at the periumbilical region is  1.2 cm image 125. Spine degenerative changes. Bilateral hip DJD.  Stable Schmorl's node at the superior endplate of L2 vertebral body.  However, at the superior posterior endplate of L4, there is a new  centrally lucent and peripherally sclerotic lesion that is 0.9 cm on  series 3 image 97.      Impression    IMPRESSION:   1.  New finding of a prominent hypodense lesion at the pancreas head  worrisome for neoplasm associated with severe biliary ductal  dilatation. Associated main pancreatic duct dilatation and more distal  pancreatic parenchymal atrophy identified. Recommend oncologic workup.  2.  Diffuse nodularity throughout the abdomen and pelvis along the  omentum, mesentery, peritoneal reflections consistent with  carcinomatosis and/or malignant adenopathy. Trace pelvic fluid with  some peripheral nodularity may be malignant trace ascites.  3.  There may be a mild degree of small bowel ileus.  4.  A small but new lucency at the superior endplate of L4 vertebral  body towards the right is indeterminant. A metastatic lesion remains  in the differential.

## 2022-09-19 NOTE — CONSULTS
[facility]  Gastroenterology Consultation         Mandy Jack  [address] [facility] [address]  88 year old female    Admission Date/Time: 9/19/2022  Primary Care Provider: Vincenzo Hassan  Referring / Attending Physician:  Dr. Anderson    We were asked to see the patient in consultation by Dr. Anderson for evaluation of abnormal LFTs weakness.      CC: Persistent weakness abnormal imaging studies and liver function tests    HPI:  Mandy Jack is a 88 year old female who presented to ER due to persistent weakness patient was diagnosed with COVID on early part of September patient has been complaining of dark urine, persistent weakness.  Patient was evaluated with CT scan which showed mass in the head of pancreas along with carcinomatosis lymphadenopathy metastatic lesions in the omentum mesentery along with possible metastatic disease involving the spine patient imaging studies also show small amount of abdominal ascites.  Liver function tests show significantly elevated LFTs with total bilirubin of 5.6 alkaline phosphatase in the range of 500 along with biliary ductal dilation.  Patient was also noticed to have electrolyte abnormalities.  Patient's other past history includes hyperlipidemia hypertension.  Patient is denying any other significant systemic complaint rest of review of system is negative.    ROS: A comprehensive ten point review of systems was negative aside from those in mentioned in the HPI.      PAST MED HX:  I have reviewed this patient's medical history and updated it with pertinent information if needed.   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 Taking?   Calcium Carbonate-Vit D-Min (CALTRATE PLUS OR) 2022 at Unknown time  Yes Yes   Sig: Take by mouth daily 600 mg- 400 units   Multiple Vitamins-Minerals (PRESERVISION AREDS 2 PO) 2022 at Unknown time  Yes Yes   Sig: Take 2 capsules by mouth daily   hydrochlorothiazide (MICROZIDE) 12.5 MG capsule 2022 at Unknown time  Yes Yes   Sig: Take 12.5 mg by mouth every morning   ketotifen (ZADITOR) 0.025 % ophthalmic solution 2022 at Unknown time  Yes Yes   Si drop 2 times daily   loperamide (IMODIUM) 2 MG capsule 2022 at Unknown time  Yes Yes   Sig: Take 1 mg by mouth daily   losartan (COZAAR) 50 MG tablet 2022 at Unknown time  Yes Yes   Sig: Take 50 mg by mouth daily   metoprolol (TOPROL-XL) 50 MG 24 hr tablet 2022 at Unknown time  Yes Yes   Sig: Take 50 mg by mouth   multivitamin, therapeutic (THERA-VIT) TABS 2022 at Unknown time  Yes Yes   Sig: Take 1 tablet by mouth daily   simvastatin (ZOCOR) 20 MG tablet   Yes No   Sig: Take by mouth At Bedtime   spironolactone (ALDACTONE) 25 MG tablet 2022 at Unknown time  Yes Yes   Sig: Take 0.5 tab (12.5mg) every other day alternating with 1 tab (25mg) by mouth every other day for hypertension. Last took 0.5 tab (12.5mg on 22).   tacrolimus (PROTOPIC) 0.1 % external ointment 2022 at Unknown time  Yes Yes   Sig: Apply topically 2 times daily      Facility-Administered Medications: None       ALLERGIES:   Allergies   Allergen Reactions     Chlorthalidone      Fentanyl      Low HR during cataract surgery     Meperidine      Nitrofurantoin      Septra [Sulfamethoxazole W/Trimethoprim]      Tolterodine        SOCIAL HISTORY:  Social History     Tobacco Use     Smoking status: Never Smoker     Smokeless tobacco: Never Used   Substance Use Topics     Alcohol use: Yes       FAMILY HISTORY:  Family History   Problem  Relation Age of Onset     Diabetes Mother        PHYSICAL EXAM:   General awake alert oriented comfortable  Vital Signs with Ranges  Temp: 98.1  F (36.7  C) Temp src: Oral BP: (!) 153/75 Pulse: 83   Resp: 16 SpO2: 98 % O2 Device: None (Room air)    No intake/output data recorded.    Constitutional: healthy, alert and no distress   Cardiovascular: negative, PMI normal. No lifts, heaves, or thrills. RRR. No murmurs, clicks gallops or rub  Respiratory: negative, Percussion normal. Good diaphragmatic excursion. Lungs clear  Head: Normocephalic. No masses, lesions, tenderness or abnormalities  Neck: Neck supple. No adenopathy. Thyroid symmetric, normal size,, Carotids without bruits.  Abdomen: Abdomen soft, non-tender. BS normal. No masses, organomegaly  SKIN: no suspicious lesions or rashes          ADDITIONAL COMMENTS:   I reviewed the patient's new clinical lab test results.   Recent Labs   Lab Test 09/19/22  1723 09/19/22  1502 08/16/19  0739   WBC  --  7.1 12.2*   HGB  --  11.4* 14.6   MCV  --  88 89   PLT  --  286 346   INR 1.13  --   --      Recent Labs   Lab Test 09/19/22  1502 08/16/19  0739 11/21/18  1133   POTASSIUM 3.8 3.7 4.1   CHLORIDE 87* 99 99   CO2 21 22 26   BUN 12 12 15   ANIONGAP 12 11 10.1     Recent Labs   Lab Test 09/19/22  1705 09/19/22  1502 08/16/19  0739   ALBUMIN  --  3.5 4.0   BILITOTAL  --  5.6* 0.8   ALT  --  914* 21   AST  --  403* 19   PROTEIN Negative  --   --    LIPASE  --  690* 84       I reviewed the patient's new imaging results.        CONSULTATION ASSESSMENT AND PLAN:    Active Problems:    Hyponatremia    Assessment: Very pleasant 88-year-old female with recent diagnosis of COVID-19 infection who presented with 2 weeks history of progressive weakness lack of energy patient work-up in the ER was consistent with soft tissue lesion involving head of pancreas along with metastatic lesions involving peritoneum omentum and cholestatic liver injury pattern consistent with biliary  obstruction with dilated CBD.  Patient's findings are highly worrisome for metastatic adenocarcinoma of pancreas.  Patient's imaging studies [place] and findings discussed in detail with patient and family.  I will recommend the patient to be evaluated with endoscopic ultrasound and ERCP with stent placement and biliary decompression.  I will also recommend to check CA 19-9 level.  Patient can be started on diet as tolerated tonight n.p.o. after midnight plan to proceed with endoscopic evaluation tomorrow.  Risk-benefit plan discussed in detail with patient and family.  Thank you very much for letting us participate in her care                  Juan Luis Mccoy MD, FACP  [facility].  Office: [phone]  Cell : [phone]      [facility]  Ph: [phone] Fax: [phone]                      88-year-old female with history of weakness shortness of breath and recent diagnosis of COVID now presented to ER CT findings are quite worrisome for pancreatic head mass with biliary obstruction along with carcinomatosis and possible intraperitoneal metastatic disease along with bony involvement.  I will recommend to check CA 19-9.  Clear liquid diet today.  Check INR.  We will recommend further evaluation with endoscopic ultrasound and ERCP to confirm the diagnosis and also decompress biliary system.  Full consult dictated    Juan Luis Mccoy MD FACP

## 2022-09-20 NOTE — ANESTHESIA PROCEDURE NOTES
Airway         Procedure Start/Stop Times: 9/20/2022 5:03 PM  Staff -        Anesthesiologist:  Megan Demarco MD       CRNA: Jessica Gilliland APRN CRNA       Performed By: CRNA  Consent for Airway        Urgency: elective  Indications and Patient Condition       Indications for airway management: vivek-procedural       Induction type:RSI       Mask difficulty assessment: 1 - vent by mask    Final Airway Details       Final airway type: endotracheal airway       Successful airway: ETT - single  Endotracheal Airway Details        ETT size (mm): 7.0       Successful intubation technique: direct laryngoscopy and video laryngoscopy       VL Blade Size: Scott 3       Grade View of Cords: 1       Adjucts: stylet       Position: Right       Measured from: lips       Secured at (cm): 21       Bite block used: None    Post intubation assessment        Placement verified by: capnometry, equal breath sounds and chest rise        Number of attempts at approach: 1       Secured with: pink tape       Ease of procedure: easy       Dentition: Intact    Medication(s) Administered   Medication Administration Time: 9/20/2022 5:03 PM

## 2022-09-20 NOTE — ED NOTES
"[facility]  ED Nurse Handoff Report    ED Chief complaint: Generalized Weakness      ED Diagnosis:   Final diagnoses:   Pancreatic mass   Hyponatremia       Code Status: Full Code    Allergies:   Allergies   Allergen Reactions     Chlorthalidone      Fentanyl      Low HR during cataract surgery     Meperidine      Nitrofurantoin      Septra [Sulfamethoxazole W/Trimethoprim]      Tolterodine        Patient Story: Pt being admitted for further eval of mass found on her pancreas and Oncology consult.  Focused Assessment:  T  reatments and/or interventions provided: Imaging, comfort,Abx  Patient's response to treatments and/or interventions: ongoing    To be done/followed up on inpatient unit:  Pancreas biopsy    Does this patient have any cognitive concerns?: none    Activity level - Baseline/Home:  Independent  Activity Level - Current:   Independent    Patient's Preferred language: English   Needed?: No    Isolation: None  Infection: Not Applicable  Patient tested for COVID 19 prior to admission: YES  Bariatric?: No    Vital Signs:   Vitals:    09/19/22 1726 09/19/22 1800 09/19/22 2000 09/20/22 0845   BP: (!) 157/76  (!) 153/75 (!) 146/76   Pulse: 83      Resp: 16   16   Temp:       TempSrc:       SpO2: 97%  98% 97%   Weight:  60.8 kg (134 lb)     Height:  1.575 m (5' 2\")         Cardiac Rhythm:     Was the PSS-3 completed:   Yes  What interventions are required if any?               Family Comments: present  OBS brochure/video discussed/provided to patient/family: N/A              Name of person given brochure if not patient: none              Relationship to patient: N/A    For the majority of the shift this patient's behavior was Green.   Behavioral interventions performed were comfort.    ED NURSE PHONE NUMBER: [phone]         "

## 2022-09-20 NOTE — ED NOTES
Patient reported that she takes her metoprolol at night and her other blood pressure medications in the morning. Writer talked to Justin about the medication change and was okay with changing that medication to be given at night. It was reconfirmed that she takes metoprolol at night. Pharmacist updated as well.

## 2022-09-20 NOTE — PROGRESS NOTES
RECEIVING UNIT ED HANDOFF REVIEW    ED Nurse Handoff Report was reviewed by: Sandra Munguia RN on September 20, 2022 at 11:34 AM

## 2022-09-20 NOTE — ANESTHESIA CARE TRANSFER NOTE
Patient: Mandy Jack    Procedure: Procedure(s):  ENDOSCOPIC RETROGRADE CHOLANGIOPANCREATOGRAPHY, COMPLEX Endoscopic ultrasound with pancreatic biopsy; Sphincterotome with Biliary Stent Placement       Diagnosis: Pancreatic mass [K86.89]  Diagnosis Additional Information: No value filed.    Anesthesia Type:   General     Note:    Oropharynx: oropharynx clear of all foreign objects  Level of Consciousness: awake  Oxygen Supplementation: face mask    Independent Airway: airway patency satisfactory and stable  Dentition: dentition unchanged  Vital Signs Stable: post-procedure vital signs reviewed and stable  Report to RN Given: handoff report given  Patient transferred to: PACU    Handoff Report: Identifed the Patient, Identified the Reponsible Provider, Reviewed the pertinent medical history, Discussed the surgical course, Reviewed Intra-OP anesthesia mangement and issues during anesthesia, Set expectations for post-procedure period and Allowed opportunity for questions and acknowledgement of understanding      Vitals:  Vitals Value Taken Time   /90 09/20/22 1747   Temp     Pulse 62 09/20/22 1749   Resp 23 09/20/22 1749   SpO2 100 % 09/20/22 1749   Vitals shown include unvalidated device data.    Electronically Signed By: SHEILA Hoyos CRNA  September 20, 2022  5:50 PM

## 2022-09-20 NOTE — PROGRESS NOTES
[facility]  Palliative Care    Patient: Mandy Jack  Date of Admission:  9/19/2022   Noted new consult for goals of care and symptom management per Dr. Middleton.    Attempted to see patient who was out of room in OR undergoing biopsy.    Spoke with daughter Gillian and  Yamil.  They request visit tomorrow afternoon when daughter Irasema can be present.  A third daughter Zeinab lives out of town.    Plan for consult tomorrow afternoon per families preference to have sejal Villalpando present for consultation.    Please call or page if there is a more urgent need.     Yanni Perez APRN, CNS, CNP  [facility], Palliative Care  Available via [facility] Smart Web  paging or [employer].

## 2022-09-20 NOTE — CONSULTS
[facility]    Hematology/Oncology Consult Note      Date of Admission:  9/19/2022  Date of Consult:  09/20/22  Reason for Consult: pancreatic mass and peritoneal carcinomatosis      ASSESSMENT/PLAN:  Mandy is a very pleasant 88-year-old female who unfortunately was recently diagnosed with metastatic cancer likely pancreatic based on the imaging biopsy pending    I discussed with her daughter Irasema as Luanne was sleeping during our conversation that at this point my recommendation would be to proceed with hospice after we obtain the biopsy.  She would not be a candidate for treatment based on her comorbidities, age and performance status.  I think the chemotherapy may buy her 1 to 2 months without really helping her quality of life.  I would recommend palliative care to help with appetite stimulation and pain control.  CA 19-9 has been ordered.  We will await the biopsy so that we can discuss final prognosis but that can be done in the outpatient setting.  Main goals in the hospital will be helping control her pain and helping with the appetite.  Family is in with agreement with this plan.  Discussed in general with this advanced disease prognosis if this is adenocarcinoma of the pancreas is usually a few months 2 to 3 months at best without any treatment    1.  Pancreatic mass: EUS/ERCP pending CA 19-9 ordered imaging reviewed  2.  Palliative care consult    We will see her back after the biopsy results are available for discussion of prognosis.  We will set up an outpatient follow-up for her.  I will see the patient tomorrow    HISTORY OF PRESENT ILLNESS: Mandy is a 88-year-old female We are asked to evaluate after she was diagnosed with a pancreatic mass and peritoneal carcinomatosis.  Patient presented to the emergency room due to persistent weakness.  She was diagnosed with COVID early part of September and at that time she had been complaining of dark urine and persistent weakness.   CT scan showed a mass in the head of the pancreas along with carcinomatosis metastatic lesions in the omentum with possible metastatic disease involving the spine.  Liver function show significantly elevated liver function tests with a bilirubin of 5.6 alkaline phosphatase in the 500s along with biliary ductal dilation.  ERCP has been scheduled.  CA 19-9 is pending.  Patient's pain is under better control today.  She does not have much of an appetite and has lost maybe about 10 to 15 pounds over the last few months.  She lives with her .  Daughter Aifa is present in the room  REVIEW OF SYSTEMS:   14 point ROS was reviewed and is negative other than as noted above in HPI.       MEDICATIONS:  Current Facility-Administered Medications   Medication     ketotifen (ZADITOR) 0.025 % ophthalmic solution 1 drop     lidocaine (LMX4) cream     lidocaine 1 % 0.1-1 mL     losartan (COZAAR) tablet 50 mg     melatonin tablet 1 mg     metoprolol succinate ER (TOPROL XL) 24 hr tablet 50 mg     multivitamin  with lutein (OCUVITE WITH LUTEIN) per capsule 2 capsule     ondansetron (ZOFRAN ODT) ODT tab 4 mg    Or     ondansetron (ZOFRAN) injection 4 mg     piperacillin-tazobactam (ZOSYN) 3.375 g vial to attach to  mL bag     sodium chloride (PF) 0.9% PF flush 3 mL     sodium chloride (PF) 0.9% PF flush 3 mL     sodium chloride 0.9% infusion     sodium chloride tablet 1 g         ALLERGIES:  Allergies   Allergen Reactions     Chlorthalidone      Fentanyl      Low HR during cataract surgery     Meperidine      Nitrofurantoin      Septra [Sulfamethoxazole W/Trimethoprim]      Tolterodine          PAST MEDICAL HISTORY:  Past Medical History:   Diagnosis Date     Degeneration of cervical intervertebral disc      Hypertension 12/20/2016     Mixed hyperlipidemia      Other chronic nonalcoholic liver disease     steatosis of liver     Other premature beats     symptomatic PVCS     Recurrent cystitis      Rosacea     rosacea  "conjunctivitis     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         PAST SURGICAL HISTORY:  Past Surgical History:   Procedure Laterality Date     GENITOURINARY SURGERY      bladder suspension     GYN SURGERY      total abdominal hysterectomy     GYN SURGERY      oophorectomy     PHACOEMULSIFICATION CLEAR CORNEA WITH TORIC INTRAOCULAR LENS IMPLANT Left 5/20/2015    Procedure: PHACOEMULSIFICATION CLEAR CORNEA WITH TORIC INTRAOCULAR LENS IMPLANT;  Surgeon: Marquise Hung MD;  Location: [facility]     PHACOEMULSIFICATION CLEAR CORNEA WITH TORIC INTRAOCULAR LENS IMPLANT Right 6/10/2015    Procedure: PHACOEMULSIFICATION CLEAR CORNEA WITH TORIC INTRAOCULAR LENS IMPLANT;  Surgeon: Marquise Hung MD;  Location: [facility]         SOCIAL HISTORY:  Social History     Socioeconomic History     Marital status:      Spouse name: Not on file     Number of children: Not on file     Years of education: Not on file     Highest education level: Not on file   Occupational History     Not on file   Tobacco Use     Smoking status: Never Smoker     Smokeless tobacco: Never Used   Substance and Sexual Activity     Alcohol use: Yes     Drug use: Not on file     Sexual activity: Not on file   Other Topics Concern     Parent/sibling w/ CABG, MI or angioplasty before 65F 55M? Not Asked   Social History Narrative     Not on file     Social Determinants of Health     Financial Resource Strain: Not on file   Food Insecurity: Not on file   Transportation Needs: Not on file   Physical Activity: Not on file   Stress: Not on file   Social Connections: Not on file   Intimate Partner Violence: Not on file   Housing Stability: Not on file         FAMILY HISTORY:  Family History   Problem Relation Age of Onset     Diabetes Mother          PHYSICAL EXAM:  Vital signs:  Temp: 98.4  F (36.9  C) Temp src: Oral BP: 126/58 Pulse: 66   Resp: 16 SpO2: 96 %     Height: 157.5 cm (5' 2\") Weight: 60.8 kg (134 lb)  Estimated body mass index is 24.51 kg/m  as calculated from the " "following:    Height as of this encounter: 1.575 m (5' 2\").    Weight as of this encounter: 60.8 kg (134 lb).    ECOG: Unable to assess GENERAL/CONSTITUTIONAL: No acute distress.  EYES: Pupils are equal, round, and react to light and accommodation. Extraocular movements intact.  No scleral icterus.  ENT/MOUTH: Neck supple. Oropharynx clear, no mucositis.  LYMPH: No anterior cervical, posterior cervical, supraclavicular, axillary or inguinal adenopathy.   RESPIRATORY: Clear to auscultation bilaterally. No crackles or wheezing.   CARDIOVASCULAR: Regular rate and rhythm without murmurs, gallops, or rubs.  GASTROINTESTINAL: Unable to assess abdomen distended bowel sounds positive.  Diffuse pain on palpationMUSCULOSKELETAL: Warm and well-perfused, no cyanosis, clubbing, or edema.  NEUROLOGIC: Cranial nerves II-XII are intact. Alert, oriented, answers questions appropriately.  INTEGUMENTARY: No rashes or jaundice.  GAIT: Steady, does not use assistive device      LABS:  CBC RESULTS:   Recent Labs   Lab Test 09/19/22  1502   WBC 7.1   RBC 3.63*   HGB 11.4*   HCT 31.8*   MCV 88   MCH 31.4   MCHC 35.8   RDW 12.9          Recent Labs   Lab Test 09/20/22  0748 09/19/22  2212 09/19/22  1502 09/19/22  1502 08/16/19  0739   * 123*   < > 120* 132*   POTASSIUM  --   --   --  3.8 3.7   CHLORIDE  --   --   --  87* 99   CO2  --   --   --  21 22   ANIONGAP  --   --   --  12 11   GLC  --   --   --  123* 109*   BUN  --   --   --  12 12   CR  --   --   --  0.62 0.86   JAVIER  --   --   --  9.8 9.5    < > = values in this interval not displayed.         PATHOLOGY:  none    IMAGING:  CT cap  1.  New finding of a prominent hypodense lesion at the pancreas head  worrisome for neoplasm associated with severe biliary ductal  dilatation. Associated main pancreatic duct dilatation and more distal  pancreatic parenchymal atrophy identified. Recommend oncologic workup.  2.  Diffuse nodularity throughout the abdomen and pelvis along " the  omentum, mesentery, peritoneal reflections consistent with  carcinomatosis and/or malignant adenopathy. Trace pelvic fluid with  some peripheral nodularity may be malignant trace ascites.  3.  There may be a mild degree of small bowel ileus.  4.  A small but new lucency at the superior endplate of L4 vertebral  body towards the right is indeterminant. A metastatic lesion remains  in the differential.        Thank you for the opportunity to participate in this patient's care.  Please call with any questions.    Ryan Middleton MD  Hematology/Oncology  [facility]

## 2022-09-20 NOTE — PROGRESS NOTES
[facility]    Hospitalist Progress Note    Assessment & Plan   Mandy Jack is a 88 year old female with PMHx of hypertension, hyperlipidemia, venous insufficiency status post venous seal and sclerotherapy to right lower extremity, diverticulitis, history of atypical chest pain, recently diagnosed COVID-19 infection on 09/03/2022 completed the Paxlovid course and presented to ED on 9/19/22 with c/o abdominal pain and generalized weakness.  Generalize weakness  Pancreatic head mass  Peritoneal carcinomatosis  Patient presented with generalized weakness  For the evaluation noted to have pancreatic head mass and biliary obstruction, GI consulted.  --Plan for EGD and EUS with biopsy noted.  --Possible malignancy, will request oncology consult.  Discussed with family.  Continue Zosyn until EUS is complete.  Hyponatremia possibly secondary to HCTZ use  --Hold HCTZ, continue IV fluids, sodium stable at 123, monitor closely.  Hypertension  --Continue losartan 50 mg daily, metoprolol 50mg long-acting, hold hydrochlorothiazide.  Hyperlipidemia  - hold simvastatin for now due to elevated LFT.   incidental finding of A small but new lucency at the superior endplate of L4 vertebralbody towards the right is indeterminant.   -A metastatic lesion remainsin the differential.  -Patient does not have any back pain and was made aware of the same and further work-up of likely pancreatic malignancy as above.  recent COVID-19 infection  -She has had COVID-19 vaccine and boosters  -Patient tested positive on 9/3 at home test and has recently completed course of paxlovoid treatment      macular degeneration  -we will continue with home meds  DVT Prophylaxis: SCDs  Code Status: No Order     Disposition: Expected discharge in 2 days  Clinically Significant Risk Factors Present on Admission         # Hyponatremia: Na = 123 mmol/L (Ref range: 133 - 144 mmol/L) on admission, will monitor as appropriate         #  Hypertension: home medication list includes antihypertensive(s)          Samina Lobo MD  Text Page   (7am to 6pm)    Interval History   Patient is resting comfortably, denies any pain, reduced appetite present, she did not notice any jaundice, she was thinking her loss of appetite is from Paxil void.    -Data reviewed today: I reviewed all new labs and imaging results over the last 24 hours.   Physical Exam   Temp: 98.1  F (36.7  C) Temp src: Oral BP: (!) 146/76 Pulse: 83   Resp: 16 SpO2: 97 % O2 Device: None (Room air)    Vitals:    09/19/22 1800   Weight: 60.8 kg (134 lb)     Vital Signs with Ranges  Temp:  [98.1  F (36.7  C)] 98.1  F (36.7  C)  Pulse:  [74-83] 83  Resp:  [16-18] 16  BP: (146-157)/(75-76) 146/76  SpO2:  [97 %-99 %] 97 %  No intake/output data recorded.    Constitutional:Awake, alert, cooperative, no apparent distress, jaundiced  Respiratory: Clear to auscultation bilaterally, no crackles or wheezing  Cardiovascular: Regular rate and rhythm, normal S1 and S2, and no murmur noted  GI: Normal bowel sounds, soft, non-distended, non-tender  Skin/Integumen: No rashes, no cyanosis, no edema  Neuro : moving all 4 extremities, no focal deficit noted     Medications       ketotifen  1 drop Right Eye BID     losartan  50 mg Oral Daily     metoprolol succinate ER  50 mg Oral At Bedtime     multivitamin  with lutein  2 capsule Oral Daily     piperacillin-tazobactam  3.375 g Intravenous Q6H     sodium chloride  1 g Oral TID w/meals       Data   Recent Labs   Lab 09/20/22  0748 09/19/22  2212 09/19/22  1723 09/19/22  1502   WBC  --   --   --  7.1   HGB  --   --   --  11.4*   MCV  --   --   --  88   PLT  --   --   --  286   INR  --   --  1.13  --    * 123*  --  120*   POTASSIUM  --   --   --  3.8   CHLORIDE  --   --   --  87*   CO2  --   --   --  21   BUN  --   --   --  12   CR  --   --   --  0.62   ANIONGAP  --   --   --  12   JAVIER  --   --   --  9.8   GLC  --   --   --  123*   ALBUMIN  --   --   --   3.5   PROTTOTAL  --   --   --  6.9   BILITOTAL  --   --   --  5.6*   ALKPHOS  --   --   --  500*   ALT  --   --   --  914*   AST  --   --   --  403*   LIPASE  --   --   --  690*     Recent Labs   Lab 09/19/22  1502   *       Imaging:   Recent Results (from the past 24 hour(s))   CT Abdomen Pelvis w Contrast    Narrative    CT ABDOMEN AND PELVIS WITH CONTRAST 9/19/2022 4:11 PM    CLINICAL HISTORY: Epigastric pain, jaundice.    TECHNIQUE: CT scan of the abdomen and pelvis was performed following  injection of IV contrast. Multiplanar reformats were obtained. Dose  reduction techniques were used.  CONTRAST:  80 mL isovue 370    COMPARISON: CT chest, abdomen and pelvis 8/16/2019.    FINDINGS:   LOWER CHEST: Normal.    HEPATOBILIARY: Interval development of severe intrahepatic biliary  ductal dilatation. This leads to abrupt decompression at the pancreas  head, see pancreas section below. No focal hepatic lesion can be seen.  The gallbladder appears to be severely contracted.    PANCREAS: New finding of a hypodense pancreas head region hypodense  structure worrisome for a mass that is approximately 2.7 x 2.4 cm in  transverse plane on series 3 image 71. Craniocaudal length of  approximately 3 cm on coronal series 4 image 25. The biliary ductal  dilatation leads to this region. A new finding of diffuse prominent  main pancreatic duct dilatation of 8 mm on series 3 image 59. The  pancreas parenchyma including the neck, body, and tail is diffusely  atrophic. The previously mentioned mass directly contacts the medial  wall of the proximal duodenum, and may just contact the undersurface  of the adjacent main portal vein, coronal series 4 image 28. This also  surrounds the course of the hepatic artery.    SPLEEN: No worrisome splenic lesion. Incidental calcification noted.    ADRENAL GLANDS: Stable mild bilateral adrenal thickening.    KIDNEYS/BLADDER: No hydronephrosis. No worrisome renal parenchymal  lesion. No  urolithiasis. A few tiny cysts not requiring specific  imaging follow-up. Bladder is unremarkable.    BOWEL: No complete small bowel obstruction. A few small bowel loops  are mildly distended.    PELVIC ORGANS: Trace pelvic fluid with some peripheral nodularity and  enhancement, for example, towards the right on series 3 image 156.    ADDITIONAL FINDINGS: Numerous areas of soft tissue nodularity along  the omentum, mesentery, and peritoneal reflections. An example lesion  along the right omentum is approximately 1.0 x 0.7 cm image 69. There  are innumerable other examples. There are several enlarged lymph nodes  near the pancreas at the upper abdomen. A portal caval example is 1.2  cm image 53. There are other adjacent small examples. Retroperitoneal  lymph nodes appear to be small at the lower abdomen and pelvis.  Several nodules versus lymph nodes at the mesentery as well.    MUSCULOSKELETAL: Tiny locule of fluid at the periumbilical region is  1.2 cm image 125. Spine degenerative changes. Bilateral hip DJD.  Stable Schmorl's node at the superior endplate of L2 vertebral body.  However, at the superior posterior endplate of L4, there is a new  centrally lucent and peripherally sclerotic lesion that is 0.9 cm on  series 3 image 97.      Impression    IMPRESSION:   1.  New finding of a prominent hypodense lesion at the pancreas head  worrisome for neoplasm associated with severe biliary ductal  dilatation. Associated main pancreatic duct dilatation and more distal  pancreatic parenchymal atrophy identified. Recommend oncologic workup.  2.  Diffuse nodularity throughout the abdomen and pelvis along the  omentum, mesentery, peritoneal reflections consistent with  carcinomatosis and/or malignant adenopathy. Trace pelvic fluid with  some peripheral nodularity may be malignant trace ascites.  3.  There may be a mild degree of small bowel ileus.  4.  A small but new lucency at the superior endplate of L4 vertebral  body  towards the right is indeterminant. A metastatic lesion remains  in the differential.

## 2022-09-20 NOTE — ANESTHESIA PREPROCEDURE EVALUATION
Anesthesia Pre-Procedure Evaluation    Patient: Mandy Jack   MRN: [id number] : 10/2/1933        Procedure : Procedure(s):  ENDOSCOPIC RETROGRADE CHOLANGIOPANCREATOGRAPHY, COMPLEX Endoscopic ultrasound with pancreatic biopsy          Past Medical History:   Diagnosis Date     Degeneration of cervical intervertebral disc      Hypertension 2016     Mixed hyperlipidemia      Other chronic nonalcoholic liver disease     steatosis of liver     Other premature beats     symptomatic PVCS     Recurrent cystitis      Rosacea     rosacea conjunctivitis     Symptomatic PVCs 2014      Past Surgical History:   Procedure Laterality Date     GENITOURINARY SURGERY      bladder suspension     GYN SURGERY      total abdominal hysterectomy     GYN SURGERY      oophorectomy     PHACOEMULSIFICATION CLEAR CORNEA WITH TORIC INTRAOCULAR LENS IMPLANT Left 2015    Procedure: PHACOEMULSIFICATION CLEAR CORNEA WITH TORIC INTRAOCULAR LENS IMPLANT;  Surgeon: Marquise Hung MD;  Location:  [facility]     PHACOEMULSIFICATION CLEAR CORNEA WITH TORIC INTRAOCULAR LENS IMPLANT Right 6/10/2015    Procedure: PHACOEMULSIFICATION CLEAR CORNEA WITH TORIC INTRAOCULAR LENS IMPLANT;  Surgeon: Marquise Hung MD;  Location:  [facility]      Allergies   Allergen Reactions     Chlorthalidone      Fentanyl      Low HR during cataract surgery     Meperidine      Nitrofurantoin      Septra [Sulfamethoxazole W/Trimethoprim]      Tolterodine       Social History     Tobacco Use     Smoking status: Never Smoker     Smokeless tobacco: Never Used   Substance Use Topics     Alcohol use: Yes      Wt Readings from Last 1 Encounters:   22 60.8 kg (134 lb)        Anesthesia Evaluation   Pt has had prior anesthetic.     No history of anesthetic complications       ROS/MED HX  ENT/Pulmonary: Comment: Covid + 9/3/22   (-) sleep apnea   Neurologic:       Cardiovascular: Comment: PVCs    (+) Dyslipidemia hypertension----- (-) CHF, LARSON, orthopnea/PND and  syncope   METS/Exercise Tolerance:     Hematologic:     (+) anemia,     Musculoskeletal: Comment: Cervical DDD      GI/Hepatic: Comment: IMPRESSION:   1.  New finding of a prominent hypodense lesion at the pancreas head  worrisome for neoplasm associated with severe biliary ductal  dilatation. Associated main pancreatic duct dilatation and more distal  pancreatic parenchymal atrophy identified. Recommend oncologic workup.  2.  Diffuse nodularity throughout the abdomen and pelvis along the  omentum, mesentery, peritoneal reflections consistent with  carcinomatosis and/or malignant adenopathy. Trace pelvic fluid with  some peripheral nodularity may be malignant trace ascites.  3.  There may be a mild degree of small bowel ileus.  4.  A small but new lucency at the superior endplate of L4 vertebral  body towards the right is indeterminant. A metastatic lesion remains  in the differential.    (+) GERD, liver disease,     Renal/Genitourinary: Comment: hyponatremia       Endo:       Psychiatric/Substance Use:       Infectious Disease: Comment: Covid 19+ 9/03/22      Malignancy: Comment: Pancreatic neoplasm, carcinomatosis   (+) Malignancy,     Other:            Physical Exam    Airway        Mallampati: II   TM distance: > 3 FB   Neck ROM: full   Mouth opening: > 3 cm    Respiratory Devices and Support         Dental       (+) caps and implants      Cardiovascular          Rhythm and rate: regular     Pulmonary           breath sounds clear to auscultation           OUTSIDE LABS:  CBC:   Lab Results   Component Value Date    WBC 7.1 09/19/2022    WBC 12.2 (H) 08/16/2019    HGB 11.4 (L) 09/19/2022    HGB 14.6 08/16/2019    HCT 31.8 (L) 09/19/2022    HCT 42.3 08/16/2019     09/19/2022     08/16/2019     BMP:   Lab Results   Component Value Date     (L) 09/20/2022     (L) 09/19/2022    POTASSIUM 3.8 09/19/2022    POTASSIUM 3.7 08/16/2019    CHLORIDE 87 (L) 09/19/2022    CHLORIDE 99 08/16/2019    CO2  21 09/19/2022    CO2 22 08/16/2019    BUN 12 09/19/2022    BUN 12 08/16/2019    CR 0.62 09/19/2022    CR 0.86 08/16/2019     (H) 09/19/2022     (H) 08/16/2019     COAGS:   Lab Results   Component Value Date    INR 1.13 09/19/2022     POC: No results found for: BGM, HCG, HCGS  HEPATIC:   Lab Results   Component Value Date    ALBUMIN 3.5 09/19/2022    PROTTOTAL 6.9 09/19/2022     (HH) 09/19/2022     (H) 09/19/2022    ALKPHOS 500 (H) 09/19/2022    BILITOTAL 5.6 (H) 09/19/2022     OTHER:   Lab Results   Component Value Date    JAVIER 9.8 09/19/2022    LIPASE 690 (H) 09/19/2022       Anesthesia Plan    ASA Status:  4      Anesthesia Type: General.     - Airway: ETT   Induction: RSI.      Techniques and Equipment:     - Airway: Video-Laryngoscope         Consents    Anesthesia Plan(s) and associated risks, benefits, and realistic alternatives discussed. Questions answered and patient/representative(s) expressed understanding.    - Discussed:     - Discussed with:  Patient      - Extended Intubation/Ventilatory Support Discussed: Yes.         Postoperative Care       PONV prophylaxis: Ondansetron (or other 5HT-3)     Comments:           Prior to Admission medications    Medication Sig Start Date End Date Taking? Authorizing Provider   Calcium Carbonate-Vit D-Min (CALTRATE PLUS OR) Take by mouth daily 600 mg- 400 units   Yes Reported, Patient   hydrochlorothiazide (MICROZIDE) 12.5 MG capsule Take 12.5 mg by mouth every morning   Yes Reported, Patient   ketotifen (ZADITOR) 0.025 % ophthalmic solution 1 drop 2 times daily   Yes Unknown, Entered By History   loperamide (IMODIUM) 2 MG capsule Take 1 mg by mouth daily   Yes Unknown, Entered By History   losartan (COZAAR) 50 MG tablet Take 50 mg by mouth daily   Yes Reported, Patient   metoprolol (TOPROL-XL) 50 MG 24 hr tablet Take 50 mg by mouth   Yes Reported, Patient   Multiple Vitamins-Minerals (PRESERVISION AREDS 2 PO) Take 2 capsules by mouth daily    Yes Unknown, Entered By History   multivitamin, therapeutic (THERA-VIT) TABS Take 1 tablet by mouth daily   Yes Reported, Patient   spironolactone (ALDACTONE) 25 MG tablet Take 0.5 tab (12.5mg) every other day alternating with 1 tab (25mg) by mouth every other day for hypertension. Last took 0.5 tab (12.5mg on 9/19/22).   Yes Reported, Patient   tacrolimus (PROTOPIC) 0.1 % external ointment Apply topically 2 times daily   Yes Unknown, Entered By History   simvastatin (ZOCOR) 20 MG tablet Take by mouth At Bedtime    Reported, Patient     Current Facility-Administered Medications Ordered in Epic   Medication Dose Route Frequency Last Rate Last Admin     [Auto Hold] ketotifen (ZADITOR) 0.025 % ophthalmic solution 1 drop  1 drop Right Eye BID   1 drop at 09/20/22 0835     lactated ringers infusion   Intravenous Continuous 25 mL/hr at 09/20/22 1554 New Bag at 09/20/22 1554     [Auto Hold] lidocaine (LMX4) cream   Topical Q1H PRN         [Auto Hold] lidocaine 1 % 0.1-1 mL  0.1-1 mL Other Q1H PRN         lidocaine 1 % 0.1-1 mL  0.1-1 mL Other Q1H PRN         [Auto Hold] losartan (COZAAR) tablet 50 mg  50 mg Oral Daily   50 mg at 09/20/22 0919     [Auto Hold] melatonin tablet 1 mg  1 mg Oral At Bedtime PRN         [Auto Hold] metoprolol succinate ER (TOPROL XL) 24 hr tablet 50 mg  50 mg Oral At Bedtime   50 mg at 09/19/22 2246     [Auto Hold] multivitamin  with lutein (OCUVITE WITH LUTEIN) per capsule 2 capsule  2 capsule Oral Daily   2 capsule at 09/20/22 0919     [Auto Hold] ondansetron (ZOFRAN ODT) ODT tab 4 mg  4 mg Oral Q6H PRN        Or     [Auto Hold] ondansetron (ZOFRAN) injection 4 mg  4 mg Intravenous Q6H PRN         [Auto Hold] piperacillin-tazobactam (ZOSYN) 3.375 g vial to attach to  mL bag  3.375 g Intravenous Q6H 0 mL/hr at 09/20/22 0152 3.375 g at 09/20/22 1228     [Auto Hold] sodium chloride (PF) 0.9% PF flush 3 mL  3 mL Intracatheter Q8H   3 mL at 09/20/22 1238     [Auto Hold] sodium chloride (PF)  0.9% PF flush 3 mL  3 mL Intracatheter q1 min prn         sodium chloride 0.9% infusion   Intravenous Continuous   Paused at 09/20/22 1531     [Auto Hold] sodium chloride tablet 1 g  1 g Oral TID w/meals   1 g at 09/20/22 1228     No current [facility]-ordered outpatient medications on file.       lactated ringers 25 mL/hr at 09/20/22 1554     sodium chloride Stopped (09/20/22 1531)     No results for input(s): ABO, RH in the last 77317 hours.  No results for input(s): HCG in the last 26694 hours.  Recent Results (from the past 744 hour(s))   CT Abdomen Pelvis w Contrast    Narrative    CT ABDOMEN AND PELVIS WITH CONTRAST 9/19/2022 4:11 PM    CLINICAL HISTORY: Epigastric pain, jaundice.    TECHNIQUE: CT scan of the abdomen and pelvis was performed following  injection of IV contrast. Multiplanar reformats were obtained. Dose  reduction techniques were used.  CONTRAST:  80 mL isovue 370    COMPARISON: CT chest, abdomen and pelvis 8/16/2019.    FINDINGS:   LOWER CHEST: Normal.    HEPATOBILIARY: Interval development of severe intrahepatic biliary  ductal dilatation. This leads to abrupt decompression at the pancreas  head, see pancreas section below. No focal hepatic lesion can be seen.  The gallbladder appears to be severely contracted.    PANCREAS: New finding of a hypodense pancreas head region hypodense  structure worrisome for a mass that is approximately 2.7 x 2.4 cm in  transverse plane on series 3 image 71. Craniocaudal length of  approximately 3 cm on coronal series 4 image 25. The biliary ductal  dilatation leads to this region. A new finding of diffuse prominent  main pancreatic duct dilatation of 8 mm on series 3 image 59. The  pancreas parenchyma including the neck, body, and tail is diffusely  atrophic. The previously mentioned mass directly contacts the medial  wall of the proximal duodenum, and may just contact the undersurface  of the adjacent main portal vein, coronal series 4 image 28. This also  surrounds  the course of the hepatic artery.    SPLEEN: No worrisome splenic lesion. Incidental calcification noted.    ADRENAL GLANDS: Stable mild bilateral adrenal thickening.    KIDNEYS/BLADDER: No hydronephrosis. No worrisome renal parenchymal  lesion. No urolithiasis. A few tiny cysts not requiring specific  imaging follow-up. Bladder is unremarkable.    BOWEL: No complete small bowel obstruction. A few small bowel loops  are mildly distended.    PELVIC ORGANS: Trace pelvic fluid with some peripheral nodularity and  enhancement, for example, towards the right on series 3 image 156.    ADDITIONAL FINDINGS: Numerous areas of soft tissue nodularity along  the omentum, mesentery, and peritoneal reflections. An example lesion  along the right omentum is approximately 1.0 x 0.7 cm image 69. There  are innumerable other examples. There are several enlarged lymph nodes  near the pancreas at the upper abdomen. A portal caval example is 1.2  cm image 53. There are other adjacent small examples. Retroperitoneal  lymph nodes appear to be small at the lower abdomen and pelvis.  Several nodules versus lymph nodes at the mesentery as well.    MUSCULOSKELETAL: Tiny locule of fluid at the periumbilical region is  1.2 cm image 125. Spine degenerative changes. Bilateral hip DJD.  Stable Schmorl's node at the superior endplate of L2 vertebral body.  However, at the superior posterior endplate of L4, there is a new  centrally lucent and peripherally sclerotic lesion that is 0.9 cm on  series 3 image 97.      Impression    IMPRESSION:   1.  New finding of a prominent hypodense lesion at the pancreas head  worrisome for neoplasm associated with severe biliary ductal  dilatation. Associated main pancreatic duct dilatation and more distal  pancreatic parenchymal atrophy identified. Recommend oncologic workup.  2.  Diffuse nodularity throughout the abdomen and pelvis along the  omentum, mesentery, peritoneal reflections consistent  with  carcinomatosis and/or malignant adenopathy. Trace pelvic fluid with  some peripheral nodularity may be malignant trace ascites.  3.  There may be a mild degree of small bowel ileus.  4.  A small but new lucency at the superior endplate of L4 vertebral  body towards the right is indeterminant. A metastatic lesion remains  in the differential.       VIMAL CAMARGO MD         SYSTEM ID:  [id number]            Megan Demarco MD

## 2022-09-20 NOTE — PROGRESS NOTES
[facility]  Gastroenterology Progress Note     Mandy Jack MRN# [id number]   YOB: 1933 Age: 88 year old          Assessment and Plan:   Mandy Jack is a 88 year old female with PMHx of hypertension, hyperlipidemia, venous insufficiency status post venous seal and sclerotherapy to right lower extremity, diverticulitis, history of atypical chest pain, recently diagnosed COVID-19 infection on 09/03/2022 completed the Paxlovid course and presented to ED on 9/19/22 with c/o abdominal pain and generalized weakness    Abdominal pain  Weakness  Pancreatic mass  Jaundice  -Total bilirubin 5.6, direct bilirubin 4.4   Alkaline phosphatase 500  ALT//403   -CT scan A/Pwith contrast revealed prominent hypodense lesion at the pancreas head worrisome for neoplasm associated with severe biliary ductal dilatation. Associated main pancreatic duct dilatation and more distal pancreatic parenchymal atrophy identified. Diffuse nodularity throughout the abdomen and pelvis along the omentum, mesentery, peritoneal reflections consistent with carcinomatosis and/or malignant adenopathy. Trace pelvic fluid with some peripheral nodularity may be malignant trace ascites.  - Reviewed images and there is significant concern for primary pancreatic adenocarcinoma, without features of cholangitis    -- tentatively scheduled for EUS with biopsy and ERCP with probable stent placement today- official timing dependent on OR schedule  -- NPO  -- Continue IVF, antibiotics and prn pain control and antiemetics  -- Daily labs                  Interval History:   no new complaints, doing well, denies chest pain, denies shortness of breath, pain is controlled, alert, oriented to person, place and time and mild nausea and abdominal discomfort              Review of Systems:   C: NEGATIVE for fever, chills, change in weight  E/M: NEGATIVE for ear, mouth and throat problems  R: NEGATIVE for significant cough  or SOB  CV: NEGATIVE for chest pain, palpitations or peripheral edema             Medications:   I have reviewed this patient's current medications    ketotifen  1 drop Right Eye BID     losartan  50 mg Oral Daily     metoprolol succinate ER  50 mg Oral At Bedtime     multivitamin  with lutein  2 capsule Oral Daily     piperacillin-tazobactam  3.375 g Intravenous Q6H     sodium chloride  1 g Oral TID w/meals                  Physical Exam:   Vitals were reviewed  Vital Signs with Ranges  Temp:  [98.1  F (36.7  C)] 98.1  F (36.7  C)  Pulse:  [74-83] 83  Resp:  [16-18] 16  BP: (146-157)/(75-76) 146/76  SpO2:  [97 %-99 %] 97 %  No intake/output data recorded.  Constitutional: healthy, alert and no distress   Cardiovascular: negative, PMI normal. No lifts, heaves, or thrills. RRR. No murmurs, clicks gallops or rub  Respiratory: negative, Percussion normal. Good diaphragmatic excursion. Lungs clear  Abdomen: Abdomen soft, mildly tender. BS normal. No masses, organomegaly           Data:   I reviewed the patient's new clinical lab test results.   Recent Labs   Lab Test 09/19/22  1723 09/19/22  1502 08/16/19  0739   WBC  --  7.1 12.2*   HGB  --  11.4* 14.6   MCV  --  88 89   PLT  --  286 346   INR 1.13  --   --      Recent Labs   Lab Test 09/19/22  1502 08/16/19  0739 11/21/18  1133   POTASSIUM 3.8 3.7 4.1   CHLORIDE 87* 99 99   CO2 21 22 26   BUN 12 12 15   ANIONGAP 12 11 10.1     Recent Labs   Lab Test 09/19/22  1705 09/19/22  1502 08/16/19  0739   ALBUMIN  --  3.5 4.0   BILITOTAL  --  5.6* 0.8   ALT  --  914* 21   AST  --  403* 19   PROTEIN Negative  --   --    LIPASE  --  690* 84       I reviewed the patient's new imaging results.    All laboratory data reviewed  All imaging studies reviewed by me.    Jeannette Garrison PA-C,  9/20/2022  [facility]  Office : [phone]  Cell: [phone] (Dr. Mccoy)  Cell: [phone] (Jeannette Garrison PA-C)

## 2022-09-21 NOTE — PROGRESS NOTES
[facility]  Gastroenterology Progress Note     Mandy Jack MRN# [id number]   YOB: 1933 Age: 88 year old          Assessment and Plan:   Mandy Jack is a 88 year old female with PMHx of hypertension, hyperlipidemia, venous insufficiency status post venous seal and sclerotherapy to right lower extremity, diverticulitis, history of atypical chest pain, recently diagnosed COVID-19 infection on 09/03/2022 completed the Paxlovid course and presented to ED on 9/19/22 with c/o abdominal pain and generalized weakness    Abdominal pain  Weakness  Pancreatic mass  Jaundice  -Total bilirubin 5.6->2.2  Alkaline phosphatase 500->332 ALT/->650/403->250  CA 19-9 392  -CT scan A/P with contrast revealed prominent hypodense lesion at the pancreas head worrisome for neoplasm associated with severe biliary ductal dilatation. Associated main pancreatic duct dilatation and more distal pancreatic parenchymal atrophy identified. Diffuse nodularity throughout the abdomen and pelvis along the omentum, mesentery, peritoneal reflections consistent with carcinomatosis and/or malignant adenopathy. Trace pelvic fluid with some peripheral nodularity may be malignant trace ascites.  - 9/20 EUS noted large 2 cm irregular mass in head of pancreas with significant biliary and pancreatic ductal dilation along with involvement of portal venous system and narrowing of duodenal bulb. Prominent lymph nodes in peripancreatic region as well as celiac axis. Noted possible carcinomatosis and small abdominal ascites  -9/20 ERCP findings fairly consistent with above-mentioned endoscopic ultrasound findings.  Biliary sphincterotomy was done and 10 Serbian 7 cm long plastic stent was placed in the biliary system with good bile flow.    -- Full liquid diet and if tolerates ok to advance to a regular diet  -- Daily PPI  -- Await biopsy results  -- Appreciate oncology consult  -- Ok with GI to discharge patient  and will contact patient for outpatient follow up                  Interval History:   no new complaints, doing well, denies chest pain, denies shortness of breath, pain is controlled, alert, oriented to person, place and time and mild nausea and abdominal discomfort              Review of Systems:   C: NEGATIVE for fever, chills, change in weight  E/M: NEGATIVE for ear, mouth and throat problems  R: NEGATIVE for significant cough or SOB  CV: NEGATIVE for chest pain, palpitations or peripheral edema             Medications:   I have reviewed this patient's current medications    ketotifen  1 drop Right Eye BID     losartan  50 mg Oral Daily     metoprolol succinate ER  50 mg Oral At Bedtime     multivitamin  with lutein  2 capsule Oral Daily     sodium chloride (PF)  3 mL Intracatheter Q8H     sodium chloride  1 g Oral TID w/meals                  Physical Exam:   Vitals were reviewed  Vital Signs with Ranges  Temp:  [97.1  F (36.2  C)-98.7  F (37.1  C)] 98.1  F (36.7  C)  Pulse:  [62-73] 70  Resp:  [11-30] 16  BP: (126-165)/(58-96) 130/65  Cuff Mean (mmHg):  [105] 105  SpO2:  [94 %-100 %] 94 %  I/O last 3 completed shifts:  In: 2100 [I.V.:2100]  Out: 0   Constitutional: healthy, alert and no distress   Cardiovascular: negative, PMI normal. No lifts, heaves, or thrills. RRR. No murmurs, clicks gallops or rub  Respiratory: negative, Percussion normal. Good diaphragmatic excursion. Lungs clear  Abdomen: Abdomen soft, mildly tender. BS normal. No masses, organomegaly           Data:   I reviewed the patient's new clinical lab test results.   Recent Labs   Lab Test 09/21/22  0706 09/19/22  1723 09/19/22  1502 08/16/19  0739   WBC 7.8  --  7.1 12.2*   HGB 9.8*  --  11.4* 14.6   MCV 91  --  88 89     --  286 346   INR  --  1.13  --   --      Recent Labs   Lab Test 09/21/22  0706 09/19/22  1502 08/16/19  0739   POTASSIUM 3.6 3.8 3.7   CHLORIDE 99 87* 99   CO2 19* 21 22   BUN 8 12 12   ANIONGAP 9 12 11     Recent  Labs   Lab Test 09/21/22  0706 09/19/22  1705 09/19/22  1502 08/16/19  0739   ALBUMIN 2.6*  --  3.5 4.0   BILITOTAL 2.2*  --  5.6* 0.8   *  --  914* 21   *  --  403* 19   PROTEIN  --  Negative  --   --    LIPASE  --   --  690* 84       I reviewed the patient's new imaging results.    All laboratory data reviewed  All imaging studies reviewed by me.    Jeannette Garrison PA-C,  9/20/2022  [facility]  Office : [phone]  Cell: [phone] (Dr. Mccoy)  Cell: [phone] (Jeannette Garrison PA-C)

## 2022-09-21 NOTE — PROVIDER NOTIFICATION
MD Notification    Notified Person: MD    Notified Person Name:  Valdez    Notification Date/Time: 9/20/2022 @ 2139    Notification Interaction: web-paged    Purpose of Notification: Patient had an EGD/EUS today.  Per GI note, they recommend clear liquid diet, ADAT but there is no order for it.  Could you please order?    Orders Received:    Comments:

## 2022-09-21 NOTE — PROGRESS NOTES
Endoscopic ultrasound findings consistent with large 4 cm irregular mass involving head of pancreas with significant biliary and pancreatic ductal dilation along with involvement of portal venous system patient also had narrowing of duodenum bulb due to tumor infiltration.  Fine-needle biopsies from head of pancreas were done.  Patient was also noticed to have prominent lymph nodes in the peripancreatic region and celiac axis area along with that patient also has abnormal appearance and possible carcinomatosis and small abdominal ascites.  ERCP findings fairly consistent with above-mentioned endoscopic ultrasound findings.  Biliary sphincterotomy was done and 10 Kyrgyz 7 cm long plastic stent was placed in the biliary system with good bile flow.  I will recommend to start patient on clear liquid diet and advance as tolerated.  Continue on Protonix 40 mg daily.  Await biopsy result further evaluation in oncology.    Juan Luis Mccoy MD FACP  [facility]

## 2022-09-21 NOTE — PLAN OF CARE
Shift note:   1st day Post procedure, had ERCP, pancreatic biopsy and biliary stent placement, she is AO x 4, denies pain pain, VSS, she requested for a sip fluid and Hospitalist was informed and pt was commenced on clear liquid diet, currently tolerating the clear liquid diet. Hear sounds are normal, chest is clear, abdomen is soft, moves with respiration, has no pedal edema. She is standby assist, on N/S 100 mils/hour, vitals 8 hourly. Currently awaiting the result of biopsy and and evaluation with oncology unit.

## 2022-09-21 NOTE — ANESTHESIA POSTPROCEDURE EVALUATION
Patient: Mandy Jack    Procedure: Procedure(s):  ENDOSCOPIC RETROGRADE CHOLANGIOPANCREATOGRAPHY, COMPLEX Endoscopic ultrasound with pancreatic biopsy; Sphincterotome with Biliary Stent Placement       Anesthesia Type:  General    Note:     Postop Pain Control: Uneventful            Sign Out: Well controlled pain   PONV: No   Neuro/Psych: Uneventful            Sign Out: Acceptable/Baseline neuro status   Airway/Respiratory: Uneventful            Sign Out: Acceptable/Baseline resp. status   CV/Hemodynamics: Uneventful            Sign Out: Acceptable CV status   Other NRE: NONE   DID A NON-ROUTINE EVENT OCCUR? No           Last vitals:  Vitals Value Taken Time   /68 09/20/22 1820   Temp 36.7  C (98.1  F) 09/20/22 1820   Pulse 63 09/20/22 1830   Resp 30 09/20/22 1830   SpO2 96 % 09/20/22 1830   Vitals shown include unvalidated device data.    Electronically Signed By: Megan Demarco MD  September 20, 2022  8:35 PM

## 2022-09-21 NOTE — PLAN OF CARE
POD1. VSS on RA. Denies pain or N/V; no abd discomfort. Full liquid diet, if tolerating can advance to regular. Up independent in room. Ambulated in de jesus x1. R PIV infusing NS @ 100 ml/hr. Palliative met w/ pt & family today - see note. Discharge home likely tomorrow, await for biopsies outpatient.

## 2022-09-21 NOTE — CONSULTS
[facility]  Palliative Care Consultation Note    Patient: Mandy Jack  Date of Admission:  9/19/2022    Requesting Clinician / Team: Dr. Middleton/[facility]   Reason for consult: Symptom management, Goals of care    Recommendations:    Pt and family interested in discharging home with hospice services.  consult for hospice, agency TBD     Pt elects DNR/DNI    Morphine 5mg SL every 2hrs PRN pain, SOB    These recommendations have been given to unit PATI Robles.    Micheline Mcbride, APRN CNP  [facility]  Contact information available via [facility] Paging/Directory      Thank you for the opportunity to participate in the care of this patient and family. Our team: does not plan on following further, however do not hesitate to call or re-consult if we can be of further assistance to the patient/family.     During regular M-F work hours ([phone]) -- if you are not sure who specifically to contact -- please contact us on [facility] Smart Web.     After regular work hours and on weekends/holidays, you can call our answering service at [phone].     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 She is found to have a pancreatic head mass with biliary obstruction and peritoneal carcinomatosis. She is s/p EUS/ERCP with successful biliary stent placement, bx taken and pathology pending, suspect  "pancreatic primary. LFTs improving post procedure.     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 Introduced myself and our services; assistance in pain and symptom management, emotional and spiritual support, and complex medical decision making.    Pt and family understand that pt has suspected pancreatic CA. She is s/p procedure with stenting placed. She feels a lot better today, her skin tone is better, better energy.     Pt acknowledges that cancer directed therapy would be too hard on her body with too many burdensome side effects. I concur. We agree that prioritizing symptom relief is most appropriate. We note that this condition will lead to her dying process, likely measured in months.     We review the option of folding in hospice support. Educated patient and family regarding hospice philosophy and prognostic criteria. Dispelled common myths. Discussed what hospice is (and is not), what services are usually provided (and those that are not, ex \"nursing home care\"), under what circumstances people tend to enroll, and the variety of places people can get hospice care (along with subsequent financial implications). Discussed typical anticipated timing of discharge. Based on this discussion, Mandy would like to move forward with hospice planning.     I reviewed code status. I educated regarding the pros and cons of attempting cardiac resuscitation. Discussed probability of survival as well as quality of life implications. I educated regarding the pros and cons of intubation and mechanical ventilation. Discussed probability of survival as well as quality of life implications. Recommended DNR/DNI and Mandy is in agreement.     Emotional support provided.     Prognosis, Goals, & Planning:      Functional Status just prior " to hospitalization: 0 (Fully active, able to carry on all activities without restriction)      Prognosis, Goals, and/or Advance Care Planning were addressed today: Yes      Patient's decision making preferences: with input from medical clinicians and loved ones          Patient has decision-making capacity today for complex decisions: Yes            I have concerns about the patient/family's health literacy today: No           Patient has a completed Health Care Directive: No.       Code status: No CPR / No Intubation    Coping, Meaning, & Spirituality:   Mood, coping, and/or meaning in the context of serious illness were addressed today: Yes  Summary/Comments: Newly diagnosed met cancer and facing her mortality will likely need more time to process, but pt is fairly accepting of where she is at. She has great support from her , adult children, grandchildren, friends.     Social:     Living situation: Lives in [address] [place] [address] with     V[place]la family / caregivers:  Greg of 67 years, supportive dtr Irasema present, another dtr Gillian     History of Present Illness:  History gathered today from: patient, family/loved ones, medical chart, medical team members, unit team members    Mandy Jack is a 88 year old female with PMH significant for HTN, HLD, venous insufficiency s/p venous seal and sclerotherapy to right lower extremity, diverticulitis, newly diagnosed macular degeneration, and recently diagnosed COVID-19 infection s/p Paxlovid course who presents with abdominal pain and generalized weakness. She is found to have a pancreatic head mass with biliary obstruction and peritoneal carcinomatosis. She is s/p EUS/ERCP with successful biliary stent placement, bx taken and pathology pending, suspect pancreatic primary. LFTs improving post procedure.     Key Palliative Symptom Data:  # Pain severity the last 12 hours: none  # Dyspnea severity the last 12 hours: none  # Nausea severity the last 12  hours: none  # Anxiety severity the last 12 hours: none    Patient is on opioids: bowels not assessed today.    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        Past Surgical History:  Past Surgical History:   Procedure Laterality Date     ENDOSCOPIC RETROGRADE CHOLANGIOPANCREATOGRAM COMPLEX N/A 9/20/2022    Procedure: ENDOSCOPIC RETROGRADE CHOLANGIOPANCREATOGRAPHY, COMPLEX Endoscopic ultrasound with pancreatic biopsy; Sphincterotome with Biliary Stent Placement;  Surgeon: Juan Luis Mccoy MD;  Location:  OR     GENITOURINARY SURGERY      bladder suspension     GYN SURGERY      total abdominal hysterectomy     GYN SURGERY      oophorectomy     PHACOEMULSIFICATION CLEAR CORNEA WITH TORIC INTRAOCULAR LENS IMPLANT Left 5/20/2015    Procedure: PHACOEMULSIFICATION CLEAR CORNEA WITH TORIC INTRAOCULAR LENS IMPLANT;  Surgeon: Marquise Hung MD;  Location: [facility]     PHACOEMULSIFICATION CLEAR CORNEA WITH TORIC INTRAOCULAR LENS IMPLANT Right 6/10/2015    Procedure: PHACOEMULSIFICATION CLEAR CORNEA WITH TORIC INTRAOCULAR LENS IMPLANT;  Surgeon: Marquise Hung MD;  Location: [facility]         Family History:  Family History   Problem Relation Age of Onset     Diabetes Mother          Allergies:  Allergies   Allergen Reactions     Chlorthalidone      Fentanyl      Low HR during cataract surgery     Meperidine      Nitrofurantoin      Septra [Sulfamethoxazole W/Trimethoprim]      Tolterodine         Medications:  I have reviewed this patient's medication profile and medications from this hospitalization.   Noted  scheduled meds are:  Marinol 2.5mg PO BID   IVF    Noted PRN meds are:  Morphine 5mg SL every 2hrs PRN pain, SOB    Physical Exam:  Vital Signs: Temp: 98.1  F (36.7  C) Temp src: Oral BP: 130/65 Pulse: 70   Resp: 16 SpO2: 94 % O2 Device: None (Room air) Oxygen Delivery: 6 LPM  Weight: 134 lbs 0 oz  CONSTITUTIONAL: Well appearing woman seen resting in bed in NAD, A&Ox3. Calm and cooperative. Family present   HEENT: NCAT  RESPIRATORY: NL respiratory effort on RA  MUSCULOSKELETAL: Moving freely in bed   NEUROLOGIC: Appropriately responsive during interview  PSYCH: Affect engaged, congruent     Data reviewed:  Recent imaging reviewed, my comments on pertinents:   Results for orders placed or performed during the hospital encounter of 09/19/22   CT Abdomen Pelvis w Contrast    Impression    IMPRESSION:   1.  New finding of a prominent hypodense lesion at the pancreas head  worrisome for neoplasm associated with severe biliary ductal  dilatation. Associated main pancreatic duct dilatation and more distal  pancreatic parenchymal atrophy identified. Recommend oncologic workup.  2.  Diffuse nodularity throughout the abdomen and pelvis along the  omentum, mesentery, peritoneal reflections consistent with  carcinomatosis and/or malignant adenopathy. Trace pelvic fluid with  some peripheral nodularity may be malignant trace ascites.  3.  There may be a mild degree of small bowel ileus.  4.  A small but new lucency at the superior endplate of L4 vertebral  body towards the right is indeterminant. A metastatic lesion remains  in the differential.       VIMAL CAMARGO MD         SYSTEM ID:  [id number]       Recent lab data reviewed, my comments on pertinents:   Na 127  K 3.6  Creat 0.63  Albumin 2.6  Alk phos 332 down from 500   down from 914   down from 403  Bili 2.2 down from 5.6  WBC 7.8  Hgb 9.8  Plt 248  INR 1.13

## 2022-09-21 NOTE — PROGRESS NOTES
[facility]    Hospitalist Progress Note    Assessment & Plan   Mandy Jack is a 88 year old female with PMHx of hypertension, hyperlipidemia, venous insufficiency status post venous seal and sclerotherapy to right lower extremity, diverticulitis, history of atypical chest pain, recently diagnosed COVID-19 infection on 09/03/2022 completed the Paxlovid course and presented to ED on 9/19/22 with c/o abdominal pain and generalized weakness.  Generalize weakness  Pancreatic head mass  Peritoneal carcinomatosis  Patient presented with generalized weakness  For the evaluation noted to have pancreatic head mass and biliary obstruction, GI consulted.  -- Patient underwent EGD and EUS on 9/20, seen by hematology oncology, current recommendation is to opt for palliative approach.  Patient and family talk to palliative care and they are planning a meeting later today.  Possibly patient will be discharged on hospice.  Plan for discharge tomorrow 9/22.  --Zosyn discontinued.    Hyponatremia possibly secondary to HCTZ use  --Hold HCTZ, continue IV fluids, sodium improving slowly, 127 today.  We will repeat labs in AM.  Hypertension  --Continue losartan 50 mg daily, metoprolol 50mg long-acting, hold hydrochlorothiazide.  Blood pressures are stable off of hydrochlorothiazide.  Hyperlipidemia  - hold simvastatin for now due to elevated LFT.   incidental finding of A small but new lucency at the superior endplate of L4 vertebralbody towards the right is indeterminant.   -A metastatic lesion remainsin the differential.  -Patient does not have any back pain and was made aware of the same and further work-up of likely pancreatic malignancy as above.  recent COVID-19 infection  -She has had COVID-19 vaccine and boosters  -Patient tested positive on 9/3 at home test and has recently completed course of paxlovoid treatment      macular degeneration  -we will continue with home meds  DVT Prophylaxis: SCDs  Code  Status: Full Code     Disposition: Expected discharge in 2 days  Clinically Significant Risk Factors Present on Admission                      Samina Lobo MD  Text Page   (7am to 6pm)    Interval History   Jaundice has improved, she is feeling much better, appetite is still very poor, discussed about pancreatic involvement in appetite, will start on Marinol.  Discussed with patient and her daughter    -Data reviewed today: I reviewed all new labs and imaging results over the last 24 hours.   Physical Exam   Temp: 98.1  F (36.7  C) Temp src: Oral BP: 130/65 Pulse: 70   Resp: 16 SpO2: 94 % O2 Device: None (Room air) Oxygen Delivery: 6 LPM  Vitals:    09/19/22 1800   Weight: 60.8 kg (134 lb)     Vital Signs with Ranges  Temp:  [97.1  F (36.2  C)-98.7  F (37.1  C)] 98.1  F (36.7  C)  Pulse:  [62-73] 70  Resp:  [11-30] 16  BP: (127-165)/(64-96) 130/65  Cuff Mean (mmHg):  [105] 105  SpO2:  [94 %-100 %] 94 %  I/O last 3 completed shifts:  In: 2100 [I.V.:2100]  Out: 0     Constitutional:Awake, alert, cooperative, no apparent distress, jaundiced  Respiratory: Clear to auscultation bilaterally, no crackles or wheezing  Cardiovascular: Regular rate and rhythm, normal S1 and S2, and no murmur noted  GI: Normal bowel sounds, soft, non-distended, non-tender  Skin/Integumen: No rashes, no cyanosis, no edema  Neuro : moving all 4 extremities, no focal deficit noted     Medications     sodium chloride 100 mL/hr at 09/21/22 0625       ketotifen  1 drop Right Eye BID     losartan  50 mg Oral Daily     metoprolol succinate ER  50 mg Oral At Bedtime     multivitamin  with lutein  2 capsule Oral Daily     sodium chloride (PF)  3 mL Intracatheter Q8H     sodium chloride  1 g Oral TID w/meals       Data   Recent Labs   Lab 09/21/22  0706 09/20/22  1954 09/20/22  0748 09/19/22  2212 09/19/22  1723 09/19/22  1502   WBC 7.8  --   --   --   --  7.1   HGB 9.8*  --   --   --   --  11.4*   MCV 91  --   --   --   --  88     --   --   --    --  286   INR  --   --   --   --  1.13  --    * 125* 123*   < >  --  120*   POTASSIUM 3.6  --   --   --   --  3.8   CHLORIDE 99  --   --   --   --  87*   CO2 19*  --   --   --   --  21   BUN 8  --   --   --   --  12   CR 0.63  --   --   --   --  0.62   ANIONGAP 9  --   --   --   --  12   JAVIER 8.5  --   --   --   --  9.8   GLC 87  --   --   --   --  123*   ALBUMIN 2.6*  --   --   --   --  3.5   PROTTOTAL 5.3*  --   --   --   --  6.9   BILITOTAL 2.2*  --   --   --   --  5.6*   ALKPHOS 332*  --   --   --   --  500*   *  --   --   --   --  914*   *  --   --   --   --  403*   LIPASE  --   --   --   --   --  690*    < > = values in this interval not displayed.     Recent Labs   Lab 09/21/22  0706 09/19/22  1502   GLC 87 123*       Imaging:   Recent Results (from the past 24 hour(s))   XR Surgery CAROLINA L/T 5 Min Fluoro w Stills    Narrative    This exam was marked as non-reportable because it will not be read by a   radiologist or a [place] non-radiologist provider.

## 2022-09-21 NOTE — PROGRESS NOTES
Need to wait for pathology. Ok to discharge patient from oncology standpoint once palliative care sees her I will arrange for outpatient follow up to discuss pathology results    Not a candidate for any chemotherapy and would recommend hospice. Family still wanted to do the biopsy and review pathology for prognosis purposes    Ryan Middleton MD

## 2022-09-22 NOTE — DISCHARGE SUMMARY
Discharge Note    Patient discharged to home via private vehicle  accompanied by daughter.  IV: Discontinued  Prescriptions e-prescribed to pharmacy.   Belongings reviewed and sent with patient.   Home medications returned to patient: NA  Equipment sent with: patient, N/A.   patient verbalizes understanding of discharge instructions. AVS given to patient and family.    A/Ox4, VSS on RA ex HTN. Denies pain or N/V. Advanced to regular diet, tolerating & no nausea reported. Up independent in room. Cont B/B, had BM this afternoon. L & R PIV removed. Has meeting w/ hospice agency tomorrow am.

## 2022-09-22 NOTE — PLAN OF CARE
Shift Note: A/O x 4, vss, denies pain, tolerating full liquid diet, denies nausea, no vomiting, abdomen is full, moves with respiration, bowel sound active, heart sound normal breath sound is clear, has no pedal edema, still on NS infusion 100 mils/hour. Result of pancreatic biopsy pending, for possible discharge home today.

## 2022-09-22 NOTE — CONSULTS
Care Management Initial Consult    General Information  Assessment completed with: Patient, Children,    Type of CM/SW Visit: Initial Assessment    Primary Care Provider verified and updated as needed:     Readmission within the last 30 days:        Reason for Consult: discharge planning  Advance Care Planning:            Communication Assessment  Patient's communication style: spoken language (English or Bilingual)             Cognitive  Cognitive/Neuro/Behavioral: WDL                      Living Environment:   People in home: spouse     Current living Arrangements: house      Able to return to prior arrangements: yes       Family/Social Support:  Care provided by: self  Provides care for: no one  Marital Status:              Description of Support System: Involved, Supportive    Support Assessment: Adequate family and caregiver support    Current Resources:   Patient receiving home care services: No     Community Resources: None  Equipment currently used at home:    Supplies currently used at home:      Employment/Financial:  Employment Status: retired        Financial Concerns: No concerns identified   Referral to Financial Worker: No       Lifestyle & Psychosocial Needs:  Social Determinants of Health     Tobacco Use: Low Risk      Smoking Tobacco Use: Never Smoker     Smokeless Tobacco Use: Never Used   Alcohol Use: Not on file   Financial Resource Strain: Not on file   Food Insecurity: Not on file   Transportation Needs: Not on file   Physical Activity: Not on file   Stress: Not on file   Social Connections: Not on file   Intimate Partner Violence: Not on file   Depression: Not on file   Housing Stability: Not on file       Functional Status:  Prior to admission patient needed assistance:              Mental Health Status:  Mental Health Status: No Current Concerns       Chemical Dependency Status:  Chemical Dependency Status: No Current Concerns             Values/Beliefs:  Spiritual, Cultural Beliefs,  Episcopal Practices, Values that affect care: no               Additional Information:  SW reviewed chart. Pt meet with palliative care yesterday and has chosen hospice care. She is ready to discharge home today with spouse and family. SW met with pt and her daughter, Irasema. Briefly explained hospice care and offered choice of agency. Referral sent to [facility]. They can meet with pt at her home Friday morning at 0900. Pt and family in agreement. Questions answered. Nursing aware.     KYLAH Ramesh, [facility]  [phone] Desk phone  [phone] Cell/text (Preferred)  [facility]

## 2022-09-22 NOTE — DISCHARGE SUMMARY
[facility] [place]    Discharge Summary  Hospitalist    Date of Admission:  9/19/2022  Date of Discharge:  9/22/2022  Discharging Provider: Samina Lobo MD    Discharge Diagnoses   Possible pancreatic neoplasm  Patient discharged to hospice care    History of Present Illness   Please review admission history and physical.    Hospital Course   Mandy Jack was admitted on 9/19/2022.  The following problems were addressed during her hospitalization:    Active Problems:    Hyponatremia    Pancreatic mass  Mandy Jack is a 88 year old female with PMHx of hypertension, hyperlipidemia, venous insufficiency status post venous seal and sclerotherapy to right lower extremity, diverticulitis, history of atypical chest pain, recently diagnosed COVID-19 infection on 09/03/2022 completed the Paxlovid course and presented to ED on 9/19/22 with c/o abdominal pain and generalized weakness.  Generalize weakness  Pancreatic head mass  Peritoneal carcinomatosis  Patient presented with generalized weakness  For the evaluation noted to have pancreatic head mass and biliary obstruction, GI consulted.  -- Patient underwent EGD and EUS on 9/20, seen by hematology oncology, current recommendation is to opt for palliative approach.  Patient and family talk to palliative care and they are planning a meeting later today.  Possibly patient will be discharged on hospice.  Plan for discharge  9/22.  --Zosyn discontinued.     Hyponatremia possibly secondary to HCTZ use  --Hold HCTZ, continue IV fluids, sodium improving slowly, 127 today.  We will repeat labs in AM.  Hypertension  --Continue losartan 50 mg daily, metoprolol 50mg long-acting, hold hydrochlorothiazide.  Blood pressures are stable off of hydrochlorothiazide.  Hyperlipidemia  - hold simvastatin for now due to elevated LFT.   incidental finding of A small but new lucency at the superior endplate of L4 vertebralbody towards the right is indeterminant.   -A  metastatic lesion remainsin the differential.  -Patient does not have any back pain and was made aware of the same and further work-up of likely pancreatic malignancy as above.  recent COVID-19 infection  -She has had COVID-19 vaccine and boosters  -Patient tested positive on 9/3 at home test and has recently completed course of paxlovoid treatment      macular degeneration  -we will continue with home meds      Samina Lobo MD    Significant Results and Procedures       Pending Results   These results will be followed up by oncology team  Unresulted Labs Ordered in the Past 30 Days of this Admission     Date and Time Order Name Status Description    9/20/2022  5:22 PM Surgical Pathology Exam In process     9/19/2022  5:58 PM Blood Culture Peripheral Blood Preliminary     9/19/2022  5:58 PM Blood Culture Peripheral Blood Preliminary           Code Status   Comfort Care       Primary Care Physician   JADA RUELAS    Physical Exam   Temp: 98.1  F (36.7  C) Temp src: Oral BP: (!) 140/67 Pulse: 64   Resp: 20 SpO2: 95 % O2 Device: None (Room air)    Vitals:    09/19/22 1800   Weight: 60.8 kg (134 lb)     Vital Signs with Ranges  Temp:  [98.1  F (36.7  C)-99.1  F (37.3  C)] 98.1  F (36.7  C)  Pulse:  [64-79] 64  Resp:  [18-20] 20  BP: (123-145)/(53-67) 140/67  SpO2:  [95 %] 95 %  I/O last 3 completed shifts:  In: 240 [P.O.:240]  Out: -     The patient was examined on the day of discharge.    Discharge Disposition   Discharged to home  Condition at discharge: Terminal    Consultations This Hospital Stay   GASTROENTEROLOGY IP CONSULT  HEMATOLOGY & ONCOLOGY IP CONSULT  PALLIATIVE CARE ADULT IP CONSULT  CARE MANAGEMENT / SOCIAL WORK IP CONSULT  CARE MANAGEMENT / SOCIAL WORK IP CONSULT    Time Spent on this Encounter   I, Samina Lobo MD, personally saw the patient today and spent greater than 30 minutes discharging this patient.    Discharge Orders      Reason for your hospital stay    Generalized weakness, nausea      Follow-up and recommended labs and tests     Plan is outpatient enrollment in hospice.  Follow-up with hospice physician, no further testing planned.     Activity    Your activity upon discharge: activity as tolerated     Diet    Follow this diet upon discharge: Orders Placed This Encounter      Regular Diet Adult     Discharge Medications   Current Discharge Medication List      START taking these medications    Details   ondansetron (ZOFRAN ODT) 4 MG ODT tab Take 1 tablet (4 mg) by mouth every 6 hours as needed for nausea or vomiting  Qty: 30 tablet, Refills: 0    Associated Diagnoses: Pancreatic mass         CONTINUE these medications which have NOT CHANGED    Details   Calcium Carbonate-Vit D-Min (CALTRATE PLUS OR) Take by mouth daily 600 mg- 400 units      ketotifen (ZADITOR) 0.025 % ophthalmic solution 1 drop 2 times daily      loperamide (IMODIUM) 2 MG capsule Take 1 mg by mouth daily      losartan (COZAAR) 50 MG tablet Take 50 mg by mouth daily      metoprolol (TOPROL-XL) 50 MG 24 hr tablet Take 50 mg by mouth      Multiple Vitamins-Minerals (PRESERVISION AREDS 2 PO) Take 2 capsules by mouth daily      multivitamin, therapeutic (THERA-VIT) TABS Take 1 tablet by mouth daily      tacrolimus (PROTOPIC) 0.1 % external ointment Apply topically 2 times daily         STOP taking these medications       hydrochlorothiazide (MICROZIDE) 12.5 MG capsule Comments:   Reason for Stopping:         simvastatin (ZOCOR) 20 MG tablet Comments:   Reason for Stopping:         spironolactone (ALDACTONE) 25 MG tablet Comments:   Reason for Stopping:             Allergies   Allergies   Allergen Reactions     Chlorthalidone      Fentanyl      Low HR during cataract surgery     Meperidine      Nitrofurantoin      Septra [Sulfamethoxazole W/Trimethoprim]      Tolterodine      Data   Most Recent 3 CBC's:Recent Labs   Lab Test 09/21/22  0706 09/19/22  1502 08/16/19  0739   WBC 7.8 7.1 12.2*   HGB 9.8* 11.4* 14.6   MCV 91 88 89   PLT  248 286 346      Most Recent 3 BMP's:  Recent Labs   Lab Test 09/21/22  0706 09/20/22  1954 09/20/22  0748 09/19/22  2212 09/19/22  1502 08/16/19  0739   * 125* 123*   < > 120* 132*   POTASSIUM 3.6  --   --   --  3.8 3.7   CHLORIDE 99  --   --   --  87* 99   CO2 19*  --   --   --  21 22   BUN 8  --   --   --  12 12   CR 0.63  --   --   --  0.62 0.86   ANIONGAP 9  --   --   --  12 11   JAVIER 8.5  --   --   --  9.8 9.5   GLC 87  --   --   --  123* 109*    < > = values in this interval not displayed.     Most Recent 2 LFT's:  Recent Labs   Lab Test 09/21/22  0706 09/19/22  1502   * 403*   * 914*   ALKPHOS 332* 500*   BILITOTAL 2.2* 5.6*     Most Recent INR's and Anticoagulation Dosing History:  Anticoagulation Dose History     Recent Dosing and Labs Latest Ref Rng & Units 9/19/2022    INR 0.85 - 1.15 1.13        Most Recent 3 Troponin's:  Recent Labs   Lab Test 08/16/19  0739   TROPI <0.015     Most Recent Cholesterol Panel:No lab results found.  Most Recent 6 Bacteria Isolates From Any Culture (See EPIC Reports for Culture Details):No lab results found.  Most Recent TSH, T4 and A1c Labs:No lab results found.  Results for orders placed or performed during the hospital encounter of 09/19/22   CT Abdomen Pelvis w Contrast    Narrative    CT ABDOMEN AND PELVIS WITH CONTRAST 9/19/2022 4:11 PM    CLINICAL HISTORY: Epigastric pain, jaundice.    TECHNIQUE: CT scan of the abdomen and pelvis was performed following  injection of IV contrast. Multiplanar reformats were obtained. Dose  reduction techniques were used.  CONTRAST:  80 mL isovue 370    COMPARISON: CT chest, abdomen and pelvis 8/16/2019.    FINDINGS:   LOWER CHEST: Normal.    HEPATOBILIARY: Interval development of severe intrahepatic biliary  ductal dilatation. This leads to abrupt decompression at the pancreas  head, see pancreas section below. No focal hepatic lesion can be seen.  The gallbladder appears to be severely contracted.    PANCREAS: New  finding of a hypodense pancreas head region hypodense  structure worrisome for a mass that is approximately 2.7 x 2.4 cm in  transverse plane on series 3 image 71. Craniocaudal length of  approximately 3 cm on coronal series 4 image 25. The biliary ductal  dilatation leads to this region. A new finding of diffuse prominent  main pancreatic duct dilatation of 8 mm on series 3 image 59. The  pancreas parenchyma including the neck, body, and tail is diffusely  atrophic. The previously mentioned mass directly contacts the medial  wall of the proximal duodenum, and may just contact the undersurface  of the adjacent main portal vein, coronal series 4 image 28. This also  surrounds the course of the hepatic artery.    SPLEEN: No worrisome splenic lesion. Incidental calcification noted.    ADRENAL GLANDS: Stable mild bilateral adrenal thickening.    KIDNEYS/BLADDER: No hydronephrosis. No worrisome renal parenchymal  lesion. No urolithiasis. A few tiny cysts not requiring specific  imaging follow-up. Bladder is unremarkable.    BOWEL: No complete small bowel obstruction. A few small bowel loops  are mildly distended.    PELVIC ORGANS: Trace pelvic fluid with some peripheral nodularity and  enhancement, for example, towards the right on series 3 image 156.    ADDITIONAL FINDINGS: Numerous areas of soft tissue nodularity along  the omentum, mesentery, and peritoneal reflections. An example lesion  along the right omentum is approximately 1.0 x 0.7 cm image 69. There  are innumerable other examples. There are several enlarged lymph nodes  near the pancreas at the upper abdomen. A portal caval example is 1.2  cm image 53. There are other adjacent small examples. Retroperitoneal  lymph nodes appear to be small at the lower abdomen and pelvis.  Several nodules versus lymph nodes at the mesentery as well.    MUSCULOSKELETAL: Tiny locule of fluid at the periumbilical region is  1.2 cm image 125. Spine degenerative changes. Bilateral  hip DJD.  Stable Schmorl's node at the superior endplate of L2 vertebral body.  However, at the superior posterior endplate of L4, there is a new  centrally lucent and peripherally sclerotic lesion that is 0.9 cm on  series 3 image 97.      Impression    IMPRESSION:   1.  New finding of a prominent hypodense lesion at the pancreas head  worrisome for neoplasm associated with severe biliary ductal  dilatation. Associated main pancreatic duct dilatation and more distal  pancreatic parenchymal atrophy identified. Recommend oncologic workup.  2.  Diffuse nodularity throughout the abdomen and pelvis along the  omentum, mesentery, peritoneal reflections consistent with  carcinomatosis and/or malignant adenopathy. Trace pelvic fluid with  some peripheral nodularity may be malignant trace ascites.  3.  There may be a mild degree of small bowel ileus.  4.  A small but new lucency at the superior endplate of L4 vertebral  body towards the right is indeterminant. A metastatic lesion remains  in the differential.       VIMAL CAMARGO MD         SYSTEM ID:  [id number]   XR Surgery CAROLINA L/T 5 Min Fluoro w Stills    Narrative    This exam was marked as non-reportable because it will not be read by a   radiologist or a [place] non-radiologist provider.

## 2024-05-15 NOTE — ED TRIAGE NOTES
Patient comes in with abdominal pain and generalized weakness.  Patient got diagnosed with covid 2 weeks ago and took Palxovid.  She states she has generalized weakness that has not gone away.  She also now complains of middle upper abdominal pain that is constant.  Patient looks slightly jaundiced.       Triage Assessment     Row Name 09/19/22 1157       Triage Assessment (Adult)    Airway WDL WDL       Respiratory WDL    Respiratory WDL WDL       Cardiac WDL    Cardiac WDL WDL       Peripheral/Neurovascular WDL    Peripheral Neurovascular WDL WDL       Cognitive/Neuro/Behavioral WDL    Cognitive/Neuro/Behavioral WDL WDL               Satisfactory

## 2024-12-19 NOTE — PROGRESS NOTES
"Chief Complaint  Hospital Follow Up Visit (Dc'd 12/11/24/Was admitted for GI bleed. Pt reports she is feeling better now. )    Subjective          Viviana Hodges presents to [facility]  History of Present Illness    Patient presents to follow-up from hospital was admitted for GI bleed improving    She admits to taking to have laboratory that could have exacerbated acute bleed last check was 8.8 would like to recheck today doing well no complaints    Objective   Vital Signs:   /67 (BP Location: Left arm, Patient Position: Sitting, Cuff Size: Adult)   Pulse 70   Temp 97.2 °F (36.2 °C)   Resp 16   Ht 172.7 cm (68\")   Wt 81.9 kg (180 lb 9.6 oz)   SpO2 97%   BMI 27.46 kg/m²            Physical Exam  Vitals reviewed.   Constitutional:       Appearance: Normal appearance. She is well-developed.   HENT:      Head: Normocephalic and atraumatic.      Right Ear: External ear normal.      Left Ear: External ear normal.      Nose: Nose normal.   Eyes:      Conjunctiva/sclera: Conjunctivae normal.      Pupils: Pupils are equal, round, and reactive to light.   Cardiovascular:      Rate and Rhythm: Normal rate.   Pulmonary:      Effort: Pulmonary effort is normal.      Breath sounds: Normal breath sounds.   Abdominal:      General: There is no distension.   Skin:     General: Skin is warm and dry.   Neurological:      Mental Status: She is alert and oriented to person, place, and time. Mental status is at baseline.   Psychiatric:         Mood and Affect: Mood and affect normal.         Behavior: Behavior normal.         Thought Content: Thought content normal.         Judgment: Judgment normal.          Result Review :   The following data was reviewed by: Delfin Bhandari DO on 12/19/2024:  Common labs          12/10/2024    04:08 12/10/2024    14:15 12/11/2024    05:25 12/11/2024    13:16 12/13/2024    10:21   Common Labs   Glucose 109   85   98    BUN 22   16   13    Creatinine " Cardiology Progress Note          Assessment and Plan:     1. Dyspnea, palpitations, frequent PVCs and atypical chest discomfort    We will do CT coronary angiogram looking for structural abnormality and rule out obstructive coronary artery disease.    She will follow-up with Lizet in 1 month to review testing.      2. 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  No current symptoms.      3. Hypertension, suboptimal control    Blood pressure from her outpatient primary care office was 138 systolic.  Will increase her Aldactone dose from 12.5 milligrams to 25 milligrams daily.  Could also consider increasing her losartan, but may need to recheck her potassium level in the interim if we do that.    Could also consider long-acting nitroglycerin.  Patient does not get headaches regularly.    This note was transcribed using electronic voice recognition software and there may be typographical errors present.                Interval History:   The patient is a very pleasant 85 year old whom I have seen previously in vein consultation.  She also has history of PVCs that she saw Dr. Ngo for.  The PVCs do not bother her.  2 nights ago, she was awoken from sleep with chest discomfort.  It has not happened with exertion.  She was concerned and wanted to talk about that today.    She brings in a prescription of her past medical history including being  63 years to her  Greg who has four-vessel bypass and ICD.  He is also a patient of our heart clinic.  She currently follows with Dr. Hassan at [facility] and has a good relationship with him.  She saw Javad in our clinic and likes her very much.    We did right lower extremity VenaSeal to the thigh that was very successful.  We also did sclerotherapy to the distal GS V on that leg to close down the  and venous varicosities.  This was also successful.  She has a small knot of clotted  "varicosity that is nontender to the medial aspect of her right calf.    She had some blistering from the tumescent and rapid, these have improved and pretty much resolved.  She has had echocardiogram without any significant structural abnormality.  She has not had ischemic evaluation.                     Review of Systems:   Review of Systems:  Skin:  Negative     Eyes:  Positive for glasses  ENT:  Negative    Respiratory:  Positive for shortness of breath  Cardiovascular:    Positive for;chest pain;palpitations  Gastroenterology: Negative    Genitourinary:  not assessed    Musculoskeletal:  Negative    Neurologic:  Negative    Psychiatric:  Negative    Heme/Lymph/Imm:  Positive for allergies  Endocrine:  Negative                Physical Exam:     Vitals: /80  Pulse 74  Ht 1.626 m (5' 4\")  Wt 70.4 kg (155 lb 3.2 oz)  BMI 26.64 kg/m2  Constitutional:  in no acute distress;cooperative, alert and oriented, well developed, well nourished, in no acute distress        Skin:  warm and dry to the touch, no apparent skin lesions or masses noted        Head:  normocephalic, no masses or lesions        Eyes:  pupils equal and round, conjunctivae and lids unremarkable, sclera white, no xanthalasma, EOMS intact, no nystagmus        ENT:  no pallor or cyanosis        Neck:  JVP normal        Chest:  normal breath sounds, clear to auscultation, normal A-P diameter, normal symmetry, normal respiratory excursion, no use of accessory muscles        Cardiac: regular rhythm;normal S1 and S2;no murmurs, gallops or rubs detected                  Abdomen:      benign    Extremities and Back:  no edema   Nontender thrombosed varicose vein from treatment medial side of right distal calf    Neurological:  no gross motor deficits;affect appropriate                 Medications:     Current Outpatient Prescriptions   Medication Sig Dispense Refill     ASPIRIN PO Take 81 mg by mouth daily       Calcium Carbonate-Vit D-Min (CALTRATE " 0.53   0.48   0.61    Sodium 137   139   138    Potassium 3.9   4.2   4.2    Chloride 106   109   106    Calcium 7.9   7.7   8.5    Albumin 3.2     3.6    Total Bilirubin 0.3     0.2    Alkaline Phosphatase 52     62    AST (SGOT) 17     19    ALT (SGPT) 17     18    WBC 9.42   7.39   8.12    Hemoglobin 7.7  8.1  6.8  8.6  8.8    Hematocrit 24.0  25.4  21.5  26.7  26.6    Platelets 232   205   307    Total Cholesterol     120    Triglycerides     60    HDL Cholesterol     57    LDL Cholesterol      50                    Assessment and Plan    Diagnoses and all orders for this visit:    1. Acute blood loss anemia (Primary)  -     CBC (No Diff); Future    2. Rectal bleeding  -     CBC (No Diff); Future    3. Overweight (BMI 25.0-29.9)    4. Acute non-recurrent frontal sinusitis    Other orders  -     doxycycline (VIBRAMYCIN) 100 MG capsule; Take 1 capsule by mouth 2 (Two) Times a Day.  Dispense: 14 capsule; Refill: 0      Recheck blood count today  Follow-up if no improvement or concerns continue medicines otherwise as prescribed    Continue to manage weight and obesity   BMI goal to approach under 27    Doxycycline for sinusitis        Follow Up   Return in about 2 months (around 2/19/2025), or if symptoms worsen or fail to improve, for Next scheduled follow up, Recheck.  Patient was given instructions and counseling regarding her condition or for health maintenance advice. Please see specific information pulled into the AVS if appropriate.     Transcribed from ambient dictation for Delfin Bhandari DO by Delfin Bhandari DO.  12/19/24   09:13 EST       PLUS OR) Take by mouth daily 600 mg- 400 units       desonide (DESOWEN) 0.05 % cream Apply sparingly to affected area twice daily. 60 g 0     hydrochlorothiazide (MICROZIDE) 12.5 MG capsule Take 12.5 mg by mouth every morning       losartan (COZAAR) 50 MG tablet Take 50 mg by mouth daily       magnesium 250 MG tablet Take 1 tablet by mouth daily       metoprolol (TOPROL-XL) 50 MG 24 hr tablet Take 50 mg by mouth       multivitamin, therapeutic (THERA-VIT) TABS Take 1 tablet by mouth daily       simvastatin (ZOCOR) 20 MG tablet Take by mouth At Bedtime       spironolactone (ALDACTONE) 25 MG tablet Take 12.5 mg by mouth

## 2025-05-06 NOTE — ADDENDUM NOTE
Addended by: FADY VÁZQUEZ on: 10/4/2018 10:47 AM     Modules accepted: Gulshan Nichole    
shortness of breath

## (undated) DEVICE — DRSG GAUZE 4X4" 3033

## (undated) DEVICE — SOL NACL 0.9% IRRIG 1000ML BOTTLE 2F7124

## (undated) DEVICE — NDL CANNULA INTERLINK BLUNT 18GA

## (undated) DEVICE — RAD RX ISOVUE 300 (50ML) 61% IOPAMIDOL CHARGE PER ML

## (undated) DEVICE — Device

## (undated) DEVICE — SOL WATER IRRIG 1000ML BOTTLE 2F7114

## (undated) DEVICE — ESU GROUND PAD ADULT W/CORD E7507

## (undated) DEVICE — ENDO NDL ASPIRATION ULTRASOUND 22GA ACQUIRE M00555540

## (undated) RX ORDER — PROPOFOL 10 MG/ML
INJECTION, EMULSION INTRAVENOUS
Status: DISPENSED
Start: 2022-01-01

## (undated) RX ORDER — LIDOCAINE HYDROCHLORIDE 20 MG/ML
INJECTION, SOLUTION EPIDURAL; INFILTRATION; INTRACAUDAL; PERINEURAL
Status: DISPENSED
Start: 2022-01-01

## (undated) RX ORDER — NITROGLYCERIN 0.4 MG/1
TABLET SUBLINGUAL
Status: DISPENSED
Start: 2018-11-02

## (undated) RX ORDER — METOPROLOL TARTRATE 1 MG/ML
INJECTION, SOLUTION INTRAVENOUS
Status: DISPENSED
Start: 2018-11-02

## (undated) RX ORDER — ONDANSETRON 2 MG/ML
INJECTION INTRAMUSCULAR; INTRAVENOUS
Status: DISPENSED
Start: 2022-01-01